# Patient Record
Sex: FEMALE | Race: WHITE | NOT HISPANIC OR LATINO | Employment: UNEMPLOYED | URBAN - NONMETROPOLITAN AREA
[De-identification: names, ages, dates, MRNs, and addresses within clinical notes are randomized per-mention and may not be internally consistent; named-entity substitution may affect disease eponyms.]

---

## 2017-02-06 ENCOUNTER — APPOINTMENT (OUTPATIENT)
Dept: ONCOLOGY | Facility: HOSPITAL | Age: 57
End: 2017-02-06

## 2017-03-10 ENCOUNTER — LAB (OUTPATIENT)
Dept: ONCOLOGY | Facility: HOSPITAL | Age: 57
End: 2017-03-10

## 2017-03-10 ENCOUNTER — CONSULT (OUTPATIENT)
Dept: ONCOLOGY | Facility: CLINIC | Age: 57
End: 2017-03-10

## 2017-03-10 VITALS
SYSTOLIC BLOOD PRESSURE: 168 MMHG | BODY MASS INDEX: 19.88 KG/M2 | WEIGHT: 119.3 LBS | RESPIRATION RATE: 16 BRPM | HEART RATE: 74 BPM | TEMPERATURE: 98.3 F | HEIGHT: 65 IN | DIASTOLIC BLOOD PRESSURE: 88 MMHG

## 2017-03-10 DIAGNOSIS — D64.9 ANEMIA, UNSPECIFIED TYPE: Primary | ICD-10-CM

## 2017-03-10 DIAGNOSIS — D64.9 ANEMIA, UNSPECIFIED TYPE: ICD-10-CM

## 2017-03-10 LAB
ALBUMIN SERPL-MCNC: 4.9 G/DL (ref 3.4–4.8)
ALBUMIN/GLOB SERPL: 1.8 G/DL (ref 1.1–1.8)
ALP SERPL-CCNC: 67 U/L (ref 38–126)
ALT SERPL W P-5'-P-CCNC: 25 U/L (ref 9–52)
ANION GAP SERPL CALCULATED.3IONS-SCNC: 12 MMOL/L (ref 5–15)
AST SERPL-CCNC: 32 U/L (ref 14–36)
BASOPHILS # BLD AUTO: 0.01 10*3/MM3 (ref 0–0.2)
BASOPHILS NFR BLD AUTO: 0.1 % (ref 0–2)
BILIRUB SERPL-MCNC: 0.5 MG/DL (ref 0.2–1.3)
BUN BLD-MCNC: 9 MG/DL (ref 7–21)
BUN/CREAT SERPL: 12.7 (ref 7–25)
CALCIUM SPEC-SCNC: 9.4 MG/DL (ref 8.4–10.2)
CHLORIDE SERPL-SCNC: 99 MMOL/L (ref 95–110)
CO2 SERPL-SCNC: 31 MMOL/L (ref 22–31)
CREAT BLD-MCNC: 0.71 MG/DL (ref 0.5–1)
DEPRECATED RDW RBC AUTO: 45.9 FL (ref 36.4–46.3)
EOSINOPHIL # BLD AUTO: 0.17 10*3/MM3 (ref 0–0.7)
EOSINOPHIL NFR BLD AUTO: 2.2 % (ref 0–7)
ERYTHROCYTE [DISTWIDTH] IN BLOOD BY AUTOMATED COUNT: 13.2 % (ref 11.5–14.5)
FERRITIN SERPL-MCNC: 26.6 NG/ML (ref 11.1–264)
FOLATE SERPL-MCNC: 10.6 NG/ML (ref 2.76–21)
GFR SERPL CREATININE-BSD FRML MDRD: 85 ML/MIN/1.73 (ref 51–120)
GLOBULIN UR ELPH-MCNC: 2.8 GM/DL (ref 2.3–3.5)
GLUCOSE BLD-MCNC: 112 MG/DL (ref 60–100)
HCT VFR BLD AUTO: 41 % (ref 35–45)
HCT VFR BLD AUTO: 41 % (ref 35–45)
HGB BLD-MCNC: 14.2 G/DL (ref 12–15.5)
HGB RETIC QN: 35.2 PG (ref 30–38)
IMM GRANULOCYTES # BLD: 0.01 10*3/MM3 (ref 0–0.02)
IMM GRANULOCYTES NFR BLD: 0.1 % (ref 0–0.5)
IMM RETICS NFR: 7 % (ref 3–15.9)
IRON 24H UR-MRATE: 87 MCG/DL (ref 37–170)
IRON SATN MFR SERPL: 23 % (ref 15–50)
LDH SERPL-CCNC: 518 U/L (ref 313–618)
LYMPHOCYTES # BLD AUTO: 3.26 10*3/MM3 (ref 0.6–4.2)
LYMPHOCYTES NFR BLD AUTO: 43.1 % (ref 10–50)
MCH RBC QN AUTO: 33.1 PG (ref 26.5–34)
MCHC RBC AUTO-ENTMCNC: 34.6 G/DL (ref 31.4–36)
MCV RBC AUTO: 95.6 FL (ref 80–98)
MONOCYTES # BLD AUTO: 0.48 10*3/MM3 (ref 0–0.9)
MONOCYTES NFR BLD AUTO: 6.3 % (ref 0–12)
NEUTROPHILS # BLD AUTO: 3.63 10*3/MM3 (ref 2–8.6)
NEUTROPHILS NFR BLD AUTO: 48.2 % (ref 37–80)
PLATELET # BLD AUTO: 238 10*3/MM3 (ref 150–450)
PMV BLD AUTO: 10.5 FL (ref 8–12)
POTASSIUM BLD-SCNC: 3.7 MMOL/L (ref 3.5–5.1)
PROT SERPL-MCNC: 7.7 G/DL (ref 6.3–8.6)
RBC # BLD AUTO: 4.29 10*6/MM3 (ref 3.77–5.16)
RETICS #: 0.03 10*6/MM3 (ref 0.03–0.12)
RETICS/RBC NFR AUTO: 0.74 % (ref 0.63–2.13)
RETICULOCYTE PRODUCTION INDEX: 0.56 % (ref 0.63–2.13)
SODIUM BLD-SCNC: 142 MMOL/L (ref 137–145)
TIBC SERPL-MCNC: 375 MCG/DL (ref 265–497)
VIT B12 BLD-MCNC: 193 PG/ML (ref 239–931)
WBC NRBC COR # BLD: 7.56 10*3/MM3 (ref 3.2–9.8)

## 2017-03-10 PROCEDURE — 82728 ASSAY OF FERRITIN: CPT | Performed by: INTERNAL MEDICINE

## 2017-03-10 PROCEDURE — 83010 ASSAY OF HAPTOGLOBIN QUANT: CPT | Performed by: INTERNAL MEDICINE

## 2017-03-10 PROCEDURE — 85025 COMPLETE CBC W/AUTO DIFF WBC: CPT | Performed by: INTERNAL MEDICINE

## 2017-03-10 PROCEDURE — 99406 BEHAV CHNG SMOKING 3-10 MIN: CPT | Performed by: INTERNAL MEDICINE

## 2017-03-10 PROCEDURE — 99203 OFFICE O/P NEW LOW 30 MIN: CPT | Performed by: INTERNAL MEDICINE

## 2017-03-10 PROCEDURE — 83540 ASSAY OF IRON: CPT | Performed by: INTERNAL MEDICINE

## 2017-03-10 PROCEDURE — 82746 ASSAY OF FOLIC ACID SERUM: CPT | Performed by: INTERNAL MEDICINE

## 2017-03-10 PROCEDURE — 83615 LACTATE (LD) (LDH) ENZYME: CPT | Performed by: INTERNAL MEDICINE

## 2017-03-10 PROCEDURE — 36415 COLL VENOUS BLD VENIPUNCTURE: CPT | Performed by: INTERNAL MEDICINE

## 2017-03-10 PROCEDURE — 83921 ORGANIC ACID SINGLE QUANT: CPT | Performed by: INTERNAL MEDICINE

## 2017-03-10 PROCEDURE — 80053 COMPREHEN METABOLIC PANEL: CPT | Performed by: INTERNAL MEDICINE

## 2017-03-10 PROCEDURE — 83090 ASSAY OF HOMOCYSTEINE: CPT | Performed by: INTERNAL MEDICINE

## 2017-03-10 PROCEDURE — 83550 IRON BINDING TEST: CPT | Performed by: INTERNAL MEDICINE

## 2017-03-10 PROCEDURE — 82607 VITAMIN B-12: CPT | Performed by: INTERNAL MEDICINE

## 2017-03-10 PROCEDURE — 85046 RETICYTE/HGB CONCENTRATE: CPT | Performed by: INTERNAL MEDICINE

## 2017-03-10 RX ORDER — EPINEPHRINE 0.3 MG/.3ML
0.3 INJECTION SUBCUTANEOUS
COMMUNITY

## 2017-03-10 RX ORDER — AMLODIPINE BESYLATE 5 MG/1
TABLET ORAL
COMMUNITY
Start: 2016-12-27

## 2017-03-10 NOTE — PROGRESS NOTES
DATE OF CONSULT: 3/10/2017    REQUESTING SOURCE:  Aldair Issa MD      REASON FOR CONSULTATION: Vitamin B12 deficiency      HISTORY OF PRESENT ILLNESS:    57-year-old female with a past medical history significant for alpha galactosidase deficiency, history of vitamin B12 deficiency diagnosed almost a year ago for which she try taking sublingual vitamin B12 but ended up having hives so currently not on any supplement for that.  Patient is being referred to [facility] for recommendation regarding vitamin B12 deficiency management in person with allergy to vitamin B12.  Patient complains of fatigue.  Denies any fever or chills or night sweats.  Denies any abnormal swollen and anywhere in the body.  Denies any tingling or numbness in upper or lower extremity.  Denies any problem with the balance.    PAST MEDICAL HISTORY:    Past Medical History   Diagnosis Date   • Alpha galactosidase deficiency    • Hypertension        PAST SURGICAL HISTORY:  History reviewed. No pertinent past surgical history.    ALLERGIES:    Allergies   Allergen Reactions   • Azithromycin    • Aztreonam    • Beef Extract    • Cefdinir    • Celecoxib    • Corticosteroids      PILLS ARE NOT A PROBLEM-JUST STEROID SHOTS   • Decongestant  [Pseudoephedrine]    • Ketorolac    • Levofloxacin    • Methylprednisolone      Broke out in rash all over    • Milk-Related Compounds    • Nuts    • Peanut Oil    • Penicillins    • Pimecrolimus    • Pork Allergy    • Promethazine    • Saccharin    • Sulfa Antibiotics    • Tacrolimus    • Thiazide-Type Diuretics        SOCIAL HISTORY:   Social History   Substance Use Topics   • Smoking status: Current Every Day Smoker     Packs/day: 1.00     Years: 13.00     Types: Cigarettes   • Smokeless tobacco: None   • Alcohol use No       CURRENT MEDICATIONS:    Current Outpatient Prescriptions   Medication Sig Dispense Refill   • amLODIPine (NORVASC) 5 MG tablet take 1 tablet by mouth once daily     • EPINEPHrine  (EPIPEN) 0.3 MG/0.3ML solution auto-injector injection Inject 0.3 mg into the shoulder, thigh, or buttocks.       No current facility-administered medications for this visit.             FAMILY HISTORY:    Family History   Problem Relation Age of Onset   • Cancer Mother    • Thyroid disease Mother    • Hypertension Mother    • Vitamin D deficiency Mother    • Cancer Father    • Diabetes Father    • Heart attack Father    • No Known Problems Brother    • No Known Problems Brother      Both mother and father had skin cancers.  Denies any other cancer or blood disorder or blood clot in the family.      REVIEW OF SYSTEMS:      CONSTITUTIONAL:  Complains of fatigue. Denies any fever, chills or weight loss.     HEENT:  No epistaxis, mouth sores or difficulty swallowing.    RESPIRATORY:  No new shortness of breath. No new cough or hemoptysis.    CARDIOVASCULAR:  No chest pain or palpitations.    GASTROINTESTINAL:  No abdominal pain nausea, vomiting or blood in the stool.    GENITOURINARY: No Dysuria or Hematuria.    MUSCULOSKELETAL:  No new back pain or arthralgia..    LYMPHATICS:  Denies any abnormal swollen glands anywhere in the body.    NEUROLOGICAL : No tingling or numbness. No headache or dizziness. No seizures or balance problems.    SKIN: No new skin lesions.    PHYSICAL EXAMINATION:      VITAL SIGNS:  Temp:  [98.3 °F (36.8 °C)] 98.3 °F (36.8 °C)  Heart Rate:  [74] 74  Resp:  [16] 16  BP: (168)/(88) 168/88    GENERAL:  Not in any distress.    HEENT:  Normocephalic, Atraumatic.Eyes  Shows mild pallor. No icterus. Extraocular Movements Intact. No Fascial Asymmetry noted.    NECK:  No adenopathy. NO JVD.    RESPIRATORY:  Fair air entry bilateral. No rhonchi or wheezing.    CARDIOVASCULAR:  S1, S2. Regular rate and rhythm. No murmur or gallop appreciated.    ABDOMEN:  Soft, obese, nontender. Bowel sounds present in all four quadrants.  No organomegaly appreciated.    EXTREMITIES:  No edema.No Calf  Tenderness.    NEUROLOGIC:  Alert, awake and oriented ×3.  No  Motor or sensory deficit appreciated. Cranial Nerves 2-12 grossly intact.        DIAGNOSTIC DATA:    WBC   Date Value Ref Range Status   03/10/2017 7.56 3.20 - 9.80 10*3/mm3 Final     RBC   Date Value Ref Range Status   03/10/2017 4.29 3.77 - 5.16 10*6/mm3 Final     HEMOGLOBIN   Date Value Ref Range Status   03/10/2017 14.2 12.0 - 15.5 g/dL Final     HEMATOCRIT   Date Value Ref Range Status   03/10/2017 41.0 35.0 - 45.0 % Final   03/10/2017 41.0 35.0 - 45.0 % Final     MCV   Date Value Ref Range Status   03/10/2017 95.6 80.0 - 98.0 fL Final     MCH   Date Value Ref Range Status   03/10/2017 33.1 26.5 - 34.0 pg Final     MCHC   Date Value Ref Range Status   03/10/2017 34.6 31.4 - 36.0 g/dL Final     RDW   Date Value Ref Range Status   03/10/2017 13.2 11.5 - 14.5 % Final     RDW-SD   Date Value Ref Range Status   03/10/2017 45.9 36.4 - 46.3 fl Final     MPV   Date Value Ref Range Status   03/10/2017 10.5 8.0 - 12.0 fL Final     PLATELETS   Date Value Ref Range Status   03/10/2017 238 150 - 450 10*3/mm3 Final     NEUTROPHIL %   Date Value Ref Range Status   03/10/2017 48.2 37.0 - 80.0 % Final     LYMPHOCYTE %   Date Value Ref Range Status   03/10/2017 43.1 10.0 - 50.0 % Final     MONOCYTE %   Date Value Ref Range Status   03/10/2017 6.3 0.0 - 12.0 % Final     EOSINOPHIL %   Date Value Ref Range Status   03/10/2017 2.2 0.0 - 7.0 % Final     BASOPHIL %   Date Value Ref Range Status   03/10/2017 0.1 0.0 - 2.0 % Final     IMMATURE GRANS %   Date Value Ref Range Status   03/10/2017 0.1 0.0 - 0.5 % Final     NEUTROPHILS, ABSOLUTE   Date Value Ref Range Status   03/10/2017 3.63 2.00 - 8.60 10*3/mm3 Final     LYMPHOCYTES, ABSOLUTE   Date Value Ref Range Status   03/10/2017 3.26 0.60 - 4.20 10*3/mm3 Final     MONOCYTES, ABSOLUTE   Date Value Ref Range Status   03/10/2017 0.48 0.00 - 0.90 10*3/mm3 Final     EOSINOPHILS, ABSOLUTE   Date Value Ref Range Status    03/10/2017 0.17 0.00 - 0.70 10*3/mm3 Final     BASOPHILS, ABSOLUTE   Date Value Ref Range Status   03/10/2017 0.01 0.00 - 0.20 10*3/mm3 Final     IMMATURE GRANS, ABSOLUTE   Date Value Ref Range Status   03/10/2017 0.01 0.00 - 0.02 10*3/mm3 Final     Last vitamin B12 level done at [facility] in November 2016 was low at 165.      ASSESSMENT AND PLAN:      1.  Vitamin B12 deficiency without any evident anemia: Patient's vitamin B12 level done in November 2016 was low at 165 but her hemoglobin done today is 14.5.  Patient states she tries sublingual vitamin B-12 and ended up having hives all over body.  With a history of multiple allergies and on file galactosidase deficiency would not recommend trying another round of vitamin B12 supplement at this point.  We'll refer patient to [facility] to get seen at the hematology clinic and get an opinion from than what to do about her vitamin B12 and vitamin D deficiency.  We will see patient back in about 6 weeks with a repeat CBC done on that day.  Recommendations were discussed with the patient and she is in agreement with that.    2.  Alpha galactosidase deficiency.  Patient has been following up with allergist in  [place].  Recommend continuing following with them.    3.  Health maintenance: Patient smokes about pack per day, she was counseled about smoking cessation.  About 3 minutes were spent for smoking cessation counseling today.  She remains full code.      Thank you for this consultation.    Michel Fernández MD  3/10/2017  2:05 PM

## 2017-03-11 LAB
HAPTOGLOB SERPL-MCNC: 129 MG/DL (ref 34–200)
HCYS SERPL-SCNC: 27.9 UMOL/L (ref 0–15)

## 2017-03-14 LAB — METHYLMALONATE SERPL-SCNC: 2203 NMOL/L (ref 0–378)

## 2017-03-28 ENCOUNTER — DOCUMENTATION (OUTPATIENT)
Dept: NUTRITION | Facility: HOSPITAL | Age: 57
End: 2017-03-28

## 2017-03-28 NOTE — PROGRESS NOTES
"Adult Outpatient Nutrition  Assessment    Patient Name:  Terra Serna  YOB: 1960  MRN: [id number]        Assessment Date:  3/27/2017 late entry phone assessment     CHART REVIEW  Terra Serna  1960  57 y.o.  Wt: \"112\"  Ht: 65 in  Dx: anemia/B12 deficiency/alpha galactosidase deficiency/lactose intolerance      PATIENT/FAMILY COMMENTS  Usual Body Weight: 125 lb  Weight Comments: lost 10% body weight  Diet: no dairy/nuts/mammal products/processed food in general due to allergies--can eat fish/chicken/eggs/fruit/veg/grains    Allergy Reactions: respiratory (has epi pen)/\"stomach pain\"/skin reaction/diarrhea  Supplements: afraid to try   Food Preparation: patient  Nutrition Concerns:  -unintentional weight loss  -food allergies  -diarrhea        GOAL(s)  Optimize nutrition in attempt to prevent further loss of LBM      EDUCATION  -High calorie high protein diet with small frequent feedings  -Supplementation        * Provided samples of Ensure Clear & discount coupons (ready for pt to )  -Cooking from scratch in bulk and freezing.      To reinforce education, written information with RD's name & number mailed.  Pt receptive to suggestions, and agreed to call on dietitian as needed.    Comments:  57WF coming to [facility] due to anemia. RN ask that RD call patient re:  multiple food allergies/weight loss/fear of eating. Pt has not tried soy milk  (may be an option). Pt plans to ask allergy doctor about this. If acceptable,   could add frozen fruit to make smoothies. Feel Ensure Clear may be an option as well.      Electronically signed by:  Mindy Galindo RD  03/28/17 11:07 AM   "

## 2017-04-10 ENCOUNTER — TELEPHONE (OUTPATIENT)
Dept: ONCOLOGY | Facility: CLINIC | Age: 57
End: 2017-04-10

## 2017-04-10 NOTE — TELEPHONE ENCOUNTER
SW placed call in follow up to initial notification of distress score.  Voice message left requesting call back.

## 2017-04-20 DIAGNOSIS — D64.9 ANEMIA, UNSPECIFIED TYPE: Primary | ICD-10-CM

## 2017-04-21 ENCOUNTER — OFFICE VISIT (OUTPATIENT)
Dept: ONCOLOGY | Facility: CLINIC | Age: 57
End: 2017-04-21

## 2017-04-21 ENCOUNTER — LAB (OUTPATIENT)
Dept: ONCOLOGY | Facility: HOSPITAL | Age: 57
End: 2017-04-21

## 2017-04-21 VITALS
BODY MASS INDEX: 19.84 KG/M2 | SYSTOLIC BLOOD PRESSURE: 145 MMHG | HEIGHT: 65 IN | WEIGHT: 119.1 LBS | TEMPERATURE: 98.2 F | DIASTOLIC BLOOD PRESSURE: 74 MMHG | RESPIRATION RATE: 16 BRPM | HEART RATE: 68 BPM

## 2017-04-21 DIAGNOSIS — D51.9 ANEMIA DUE TO VITAMIN B12 DEFICIENCY, UNSPECIFIED B12 DEFICIENCY TYPE: Primary | ICD-10-CM

## 2017-04-21 DIAGNOSIS — D64.9 ANEMIA, UNSPECIFIED TYPE: ICD-10-CM

## 2017-04-21 LAB
BASOPHILS # BLD AUTO: 0.01 10*3/MM3 (ref 0–0.2)
BASOPHILS NFR BLD AUTO: 0.2 % (ref 0–2)
DEPRECATED RDW RBC AUTO: 46.9 FL (ref 36.4–46.3)
EOSINOPHIL # BLD AUTO: 0.15 10*3/MM3 (ref 0–0.7)
EOSINOPHIL NFR BLD AUTO: 2.4 % (ref 0–7)
ERYTHROCYTE [DISTWIDTH] IN BLOOD BY AUTOMATED COUNT: 13.4 % (ref 11.5–14.5)
HCT VFR BLD AUTO: 39.6 % (ref 35–45)
HGB BLD-MCNC: 13.6 G/DL (ref 12–15.5)
IMM GRANULOCYTES # BLD: 0.02 10*3/MM3 (ref 0–0.02)
IMM GRANULOCYTES NFR BLD: 0.3 % (ref 0–0.5)
LYMPHOCYTES # BLD AUTO: 2.66 10*3/MM3 (ref 0.6–4.2)
LYMPHOCYTES NFR BLD AUTO: 43.4 % (ref 10–50)
MCH RBC QN AUTO: 32.8 PG (ref 26.5–34)
MCHC RBC AUTO-ENTMCNC: 34.3 G/DL (ref 31.4–36)
MCV RBC AUTO: 95.4 FL (ref 80–98)
MONOCYTES # BLD AUTO: 0.37 10*3/MM3 (ref 0–0.9)
MONOCYTES NFR BLD AUTO: 6 % (ref 0–12)
NEUTROPHILS # BLD AUTO: 2.92 10*3/MM3 (ref 2–8.6)
NEUTROPHILS NFR BLD AUTO: 47.7 % (ref 37–80)
PLATELET # BLD AUTO: 231 10*3/MM3 (ref 150–450)
PMV BLD AUTO: 10.4 FL (ref 8–12)
RBC # BLD AUTO: 4.15 10*6/MM3 (ref 3.77–5.16)
WBC NRBC COR # BLD: 6.13 10*3/MM3 (ref 3.2–9.8)

## 2017-04-21 PROCEDURE — G0463 HOSPITAL OUTPT CLINIC VISIT: HCPCS | Performed by: INTERNAL MEDICINE

## 2017-04-21 PROCEDURE — 99214 OFFICE O/P EST MOD 30 MIN: CPT | Performed by: INTERNAL MEDICINE

## 2017-04-21 PROCEDURE — 36415 COLL VENOUS BLD VENIPUNCTURE: CPT | Performed by: INTERNAL MEDICINE

## 2017-04-21 PROCEDURE — 85025 COMPLETE CBC W/AUTO DIFF WBC: CPT

## 2017-04-21 RX ORDER — MONTELUKAST SODIUM 10 MG/1
10 TABLET ORAL
COMMUNITY
Start: 2017-04-06 | End: 2018-04-07

## 2017-04-21 NOTE — PROGRESS NOTES
DATE OF VISIT: 4/21/2017    REASON FOR VISIT: Vitamin B12 deficiency with allergy to vitamin B12 supplements    HISTORY OF PRESENT ILLNESS:    57-year-old female with a past medical history significant for alpha galactosidase deficiency, history of vitamin B12 deficiency diagnosed almost a year ago for which she try taking sublingual vitamin B12 but ended up having hives was seen in consultation here at [facility] on March 10, 2017.  Patient had anemia workup done on that day.  Patient is here to discuss the result of blood work.  Still complains of fatigue.  Denies any blood in the stool or urine.  Denies any abnormal swollen glands anywhere in the body.    PAST MEDICAL HISTORY:    Past Medical History:   Diagnosis Date   • Alpha galactosidase deficiency    • Hypertension        SOCIAL HISTORY:    Social History   Substance Use Topics   • Smoking status: Current Every Day Smoker     Packs/day: 1.00     Years: 13.00     Types: Cigarettes   • Smokeless tobacco: None   • Alcohol use No       Surgical History :  History reviewed. No pertinent surgical history.    ALLERGIES:    Allergies   Allergen Reactions   • Azithromycin    • Aztreonam    • Beef Extract    • Cefdinir    • Celecoxib    • Corticosteroids      PILLS ARE NOT A PROBLEM-JUST STEROID SHOTS   • Decongestant  [Pseudoephedrine]    • Ketorolac    • Levofloxacin    • Methylprednisolone      Broke out in rash all over    • Milk-Related Compounds    • Nuts    • Peanut Oil    • Penicillins    • Pimecrolimus    • Pork Allergy    • Promethazine    • Saccharin    • Sulfa Antibiotics    • Tacrolimus    • Thiazide-Type Diuretics        REVIEW OF SYSTEMS:      CONSTITUTIONAL: Complains of fatigue. Denies any fever, chills or weight loss.      HEENT: No epistaxis, mouth sores or difficulty swallowing.     RESPIRATORY: No new shortness of breath. No new cough or hemoptysis.     CARDIOVASCULAR: No chest pain or palpitations.     GASTROINTESTINAL: No abdominal pain  "nausea, vomiting or blood in the stool.     GENITOURINARY: No Dysuria or Hematuria.     MUSCULOSKELETAL: No new back pain or arthralgia..     LYMPHATICS: Denies any abnormal swollen glands anywhere in the body.     NEUROLOGICAL : No tingling or numbness. No headache or dizziness. No seizures or balance problems.     SKIN: No new skin lesions.        PHYSICAL EXAMINATION:      VITAL SIGNS:  /74  Pulse 68  Temp 98.2 °F (36.8 °C)  Resp 16  Ht 65\" (165.1 cm)  Wt 119 lb 1.6 oz (54 kg)  BMI 19.82 kg/m2    GENERAL: Not in any distress.     HEENT: Normocephalic, Atraumatic.Eyes  Shows mild pallor. No icterus. Extraocular Movements Intact. No Fascial Asymmetry noted.     NECK: No adenopathy. NO JVD.     RESPIRATORY: Fair air entry bilateral. No rhonchi or wheezing.     CARDIOVASCULAR: S1, S2. Regular rate and rhythm. No murmur or gallop appreciated.     ABDOMEN: Soft, obese, nontender. Bowel sounds present in all four quadrants. No organomegaly appreciated.     EXTREMITIES: No edema.No Calf Tenderness.     NEUROLOGIC: Alert, awake and oriented ×3. No Motor or sensory deficit appreciated. Cranial Nerves 2-12 grossly intact.      DIAGNOSTIC DATA:    Glucose   Date Value Ref Range Status   03/10/2017 112 (H) 60 - 100 mg/dL Final     Sodium   Date Value Ref Range Status   03/10/2017 142 137 - 145 mmol/L Final     Potassium   Date Value Ref Range Status   03/10/2017 3.7 3.5 - 5.1 mmol/L Final     CO2   Date Value Ref Range Status   03/10/2017 31.0 22.0 - 31.0 mmol/L Final     Chloride   Date Value Ref Range Status   03/10/2017 99 95 - 110 mmol/L Final     Anion Gap   Date Value Ref Range Status   03/10/2017 12.0 5.0 - 15.0 mmol/L Final     Creatinine   Date Value Ref Range Status   03/10/2017 0.71 0.50 - 1.00 mg/dL Final     BUN   Date Value Ref Range Status   03/10/2017 9 7 - 21 mg/dL Final     BUN/Creatinine Ratio   Date Value Ref Range Status   03/10/2017 12.7 7.0 - 25.0 Final     Calcium   Date Value Ref Range " Status   03/10/2017 9.4 8.4 - 10.2 mg/dL Final     eGFR Non  Amer   Date Value Ref Range Status   03/10/2017 85 51 - 120 mL/min/1.73 Final     Alkaline Phosphatase   Date Value Ref Range Status   03/10/2017 67 38 - 126 U/L Final     Total Protein   Date Value Ref Range Status   03/10/2017 7.7 6.3 - 8.6 g/dL Final     ALT (SGPT)   Date Value Ref Range Status   03/10/2017 25 9 - 52 U/L Final     AST (SGOT)   Date Value Ref Range Status   03/10/2017 32 14 - 36 U/L Final     Total Bilirubin   Date Value Ref Range Status   03/10/2017 0.5 0.2 - 1.3 mg/dL Final     Albumin   Date Value Ref Range Status   03/10/2017 4.90 (H) 3.40 - 4.80 g/dL Final     Globulin   Date Value Ref Range Status   03/10/2017 2.8 2.3 - 3.5 gm/dL Final     A/G Ratio   Date Value Ref Range Status   03/10/2017 1.8 1.1 - 1.8 g/dL Final     Lab Results   Component Value Date    WBC 6.13 04/21/2017    HGB 13.6 04/21/2017    HCT 39.6 04/21/2017    MCV 95.4 04/21/2017     04/21/2017     Lab Results   Component Value Date    NEUTROABS 2.92 04/21/2017    IRON 87 03/10/2017    TIBC 375 03/10/2017    LABIRON 23 03/10/2017    FERRITIN 26.60 03/10/2017    [id number] 193 (L) 03/10/2017    FOLATE 10.60 03/10/2017     Methylmalonic Acid, Serum   Order: [id number]   Status:  Final result   Visible to patient:  No (Not Released) Dx:  Anemia, unspecified type      Ref Range & Units 1mo ago     Methylmalonic Acid 0 - 378 nmol/L 2203 (H)             Homocysteine   Order: [id number]   Status:  Final result   Visible to patient:  No (Not Released) Dx:  Anemia, unspecified type      Ref Range & Units 1mo ago     Homocystine, Plasma (Quant) 0.0 - 15.0 umol/L 27.9 (H)                   ASSESSMENT AND PLAN:      1.  Vitamin B12 deficiency with elevated methylmalonic acid and homocysteine: Patient has ongoing vitamin B12 deficiency for more than 1 year now.  In the past she has been tried on some lingual vitamin B12 which ended up giving hives.  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  Patient has been referred to [place] upon last clinic visit to get an opinion regarding her vitamin B12 deficiency treatment.  Patient has not been to [place] yet.  Again it was explained to platelets shunt in detail that she needs to find a solution for her vitamin B12 deficiency before she started developing anemia.  She is in agreement to go and gets seen by a hematologist at [facility] at this point.  We will see her back in about 6 weeks with a repeat CBC to be done on that day.    2. Alpha galactosidase deficiency. Patient has been following up with allergist in  [place]. Recommend continuing following with them.     3. Health maintenance: Patient smokes about pack per day, she was counseled about smoking cessation. About 3 minutes were spent for smoking cessation counseling today. She remains full code.         Michel Fernández MD  4/21/2017  12:29 PM        EMR Dragon/Transcription disclaimer:   Much of this encounter note is an electronic transcription/translation of spoken language to printed text. The electronic translation of spoken language may permit erroneous, or at times, nonsensical words or phrases to be inadvertently transcribed; Although I have reviewed the note for such errors, some may still exist.

## 2017-06-05 ENCOUNTER — APPOINTMENT (OUTPATIENT)
Dept: ONCOLOGY | Facility: HOSPITAL | Age: 57
End: 2017-06-05

## 2017-07-13 ENCOUNTER — TRANSCRIBE ORDERS (OUTPATIENT)
Dept: LAB | Facility: HOSPITAL | Age: 57
End: 2017-07-13

## 2017-07-13 DIAGNOSIS — D51.9 ANEMIA DUE TO VITAMIN B12 DEFICIENCY, UNSPECIFIED B12 DEFICIENCY TYPE: Primary | ICD-10-CM

## 2018-02-07 ENCOUNTER — HOSPITAL (OUTPATIENT)
Dept: OTHER | Age: 58
End: 2018-02-07
Attending: ORTHOPAEDIC SURGERY

## 2018-06-01 ENCOUNTER — HOSPITAL (OUTPATIENT)
Dept: OTHER | Age: 58
End: 2018-06-01
Attending: INTERNAL MEDICINE

## 2019-01-01 ENCOUNTER — EXTERNAL RECORD (OUTPATIENT)
Dept: OTHER | Age: 59
End: 2019-01-01

## 2019-03-22 ENCOUNTER — HOSPITAL (OUTPATIENT)
Dept: OTHER | Age: 59
End: 2019-03-22
Attending: EMERGENCY MEDICINE

## 2019-03-22 ENCOUNTER — DIAGNOSTIC TRANS (OUTPATIENT)
Dept: OTHER | Age: 59
End: 2019-03-22

## 2019-03-22 LAB
ALBUMIN SERPL-MCNC: 3.7 GM/DL (ref 3.6–5.1)
ALBUMIN/GLOB SERPL: 1.2 {RATIO} (ref 1–2.4)
ALP SERPL-CCNC: 86 UNIT/L (ref 45–117)
ALT SERPL-CCNC: 33 UNIT/L
ANALYZER ANC (IANC): ABNORMAL
ANION GAP SERPL CALC-SCNC: 10 MMOL/L (ref 10–20)
AST SERPL-CCNC: 28 UNIT/L
BASOPHILS # BLD: 0 THOUSAND/MCL (ref 0–0.3)
BASOPHILS NFR BLD: 1 %
BILIRUB SERPL-MCNC: 0.3 MG/DL (ref 0.2–1)
BUN SERPL-MCNC: 12 MG/DL (ref 6–20)
BUN/CREAT SERPL: 18 (ref 7–25)
CALCIUM SERPL-MCNC: 8.2 MG/DL (ref 8.4–10.2)
CHLORIDE: 111 MMOL/L (ref 98–107)
CO2 SERPL-SCNC: 24 MMOL/L (ref 21–32)
CREAT SERPL-MCNC: 0.67 MG/DL (ref 0.51–0.95)
DIFFERENTIAL METHOD BLD: ABNORMAL
EOSINOPHIL # BLD: 0.1 THOUSAND/MCL (ref 0.1–0.5)
EOSINOPHIL NFR BLD: 4 %
ERYTHROCYTE [DISTWIDTH] IN BLOOD: 13.1 % (ref 11–15)
GLOBULIN SER-MCNC: 3.1 GM/DL (ref 2–4)
GLUCOSE SERPL-MCNC: 133 MG/DL (ref 65–99)
HEMATOCRIT: 38.8 % (ref 36–46.5)
HGB BLD-MCNC: 12.5 GM/DL (ref 12–15.5)
IMM GRANULOCYTES # BLD AUTO: 0 THOUSAND/MCL (ref 0–0.2)
IMM GRANULOCYTES NFR BLD: 0 %
INR PPP: 1
LYMPHOCYTES # BLD: 0.8 THOUSAND/MCL (ref 1–4)
LYMPHOCYTES NFR BLD: 21 %
MAGNESIUM SERPL-MCNC: 1.9 MG/DL (ref 1.7–2.4)
MCH RBC QN AUTO: 29.6 PG (ref 26–34)
MCHC RBC AUTO-ENTMCNC: 32.2 GM/DL (ref 32–36.5)
MCV RBC AUTO: 91.7 FL (ref 78–100)
MONOCYTES # BLD: 0.4 THOUSAND/MCL (ref 0.3–0.9)
MONOCYTES NFR BLD: 11 %
NEUTROPHILS # BLD: 2.4 THOUSAND/MCL (ref 1.8–7.7)
NEUTROPHILS NFR BLD: 63 %
NEUTS SEG NFR BLD: ABNORMAL %
NRBC (NRBCRE): 0 /100 WBC
PLATELET # BLD: 149 THOUSAND/MCL (ref 140–450)
POTASSIUM SERPL-SCNC: 4 MMOL/L (ref 3.4–5.1)
PROT SERPL-MCNC: 6.8 GM/DL (ref 6.4–8.2)
PROTHROMBIN TIME: 10.7 SECONDS (ref 9.7–11.8)
PROTHROMBIN TIME: NORMAL
RBC # BLD: 4.23 MILLION/MCL (ref 4–5.2)
SODIUM SERPL-SCNC: 141 MMOL/L (ref 135–145)
TROPONIN I SERPL HS-MCNC: <0.02 NG/ML
TROPONIN I SERPL HS-MCNC: <0.02 NG/ML
WBC # BLD: 3.7 THOUSAND/MCL (ref 4.2–11)

## 2019-07-01 ENCOUNTER — TELEPHONE (OUTPATIENT)
Dept: PAIN MANAGEMENT | Age: 59
End: 2019-07-01

## 2019-07-17 ENCOUNTER — OFFICE VISIT (OUTPATIENT)
Dept: PAIN MANAGEMENT | Age: 59
End: 2019-07-17

## 2019-07-17 DIAGNOSIS — M25.561 CHRONIC PAIN OF RIGHT KNEE: ICD-10-CM

## 2019-07-17 DIAGNOSIS — M54.16 LUMBAR RADICULOPATHY: Primary | ICD-10-CM

## 2019-07-17 DIAGNOSIS — G89.29 CHRONIC PAIN OF RIGHT KNEE: ICD-10-CM

## 2019-07-17 PROCEDURE — 99204 OFFICE O/P NEW MOD 45 MIN: CPT | Performed by: PHYSICIAN ASSISTANT

## 2019-07-17 RX ORDER — VENLAFAXINE HYDROCHLORIDE 37.5 MG/1
75 CAPSULE, EXTENDED RELEASE ORAL DAILY
COMMUNITY
End: 2022-03-11

## 2019-07-17 RX ORDER — DULOXETIN HYDROCHLORIDE 60 MG/1
60 CAPSULE, DELAYED RELEASE ORAL AT BEDTIME
COMMUNITY
End: 2022-03-11

## 2019-07-17 RX ORDER — GABAPENTIN 300 MG/1
300 CAPSULE ORAL
COMMUNITY
End: 2019-07-17 | Stop reason: SDUPTHER

## 2019-07-17 RX ORDER — GABAPENTIN 300 MG/1
CAPSULE ORAL
Qty: 90 CAPSULE | Refills: 0 | Status: SHIPPED | OUTPATIENT
Start: 2019-07-17 | End: 2019-08-08 | Stop reason: SDUPTHER

## 2019-07-17 ASSESSMENT — PAIN SCALES - GENERAL: PAINLEVEL: 9-10

## 2019-08-07 ENCOUNTER — APPOINTMENT (OUTPATIENT)
Dept: PAIN MANAGEMENT | Age: 59
End: 2019-08-07

## 2019-08-08 ENCOUNTER — OFFICE VISIT (OUTPATIENT)
Dept: PAIN MANAGEMENT | Age: 59
End: 2019-08-08

## 2019-08-08 DIAGNOSIS — M48.062 SPINAL STENOSIS OF LUMBAR REGION WITH NEUROGENIC CLAUDICATION: Primary | ICD-10-CM

## 2019-08-08 PROCEDURE — 99214 OFFICE O/P EST MOD 30 MIN: CPT | Performed by: PHYSICIAN ASSISTANT

## 2019-08-08 RX ORDER — NABUMETONE 750 MG/1
750 TABLET, FILM COATED ORAL 2 TIMES DAILY PRN
Qty: 60 TABLET | Refills: 0 | Status: SHIPPED | OUTPATIENT
Start: 2019-08-08 | End: 2019-09-17 | Stop reason: SDUPTHER

## 2019-08-08 RX ORDER — GABAPENTIN 300 MG/1
CAPSULE ORAL
Qty: 90 CAPSULE | Refills: 1 | Status: SHIPPED | OUTPATIENT
Start: 2019-08-08 | End: 2019-09-24 | Stop reason: SDUPTHER

## 2019-08-08 ASSESSMENT — PAIN SCALES - GENERAL: PAINLEVEL: 7-8

## 2019-08-15 ENCOUNTER — HOSPITAL (OUTPATIENT)
Dept: OTHER | Age: 59
End: 2019-08-15

## 2019-08-16 RX ORDER — GABAPENTIN 300 MG/1
CAPSULE ORAL
Qty: 90 CAPSULE | Refills: 0 | OUTPATIENT
Start: 2019-08-16

## 2019-09-03 ENCOUNTER — HOSPITAL (OUTPATIENT)
Dept: OTHER | Age: 59
End: 2019-09-03
Attending: ANESTHESIOLOGY

## 2019-09-03 PROCEDURE — 62323 NJX INTERLAMINAR LMBR/SAC: CPT | Performed by: ANESTHESIOLOGY

## 2019-09-11 ENCOUNTER — HOSPITAL (OUTPATIENT)
Dept: OTHER | Age: 59
End: 2019-09-11
Attending: PHYSICIAN ASSISTANT

## 2019-09-11 ENCOUNTER — HOSPITAL (OUTPATIENT)
Dept: OTHER | Age: 59
End: 2019-09-11
Attending: INTERNAL MEDICINE

## 2019-09-17 DIAGNOSIS — M54.16 LUMBAR RADICULOPATHY: Primary | ICD-10-CM

## 2019-09-17 RX ORDER — NABUMETONE 750 MG/1
TABLET, FILM COATED ORAL
Qty: 60 TABLET | Refills: 0 | Status: SHIPPED | OUTPATIENT
Start: 2019-09-17 | End: 2019-10-31 | Stop reason: SDUPTHER

## 2019-09-24 ENCOUNTER — OFFICE VISIT (OUTPATIENT)
Dept: PAIN MANAGEMENT | Age: 59
End: 2019-09-24

## 2019-09-24 DIAGNOSIS — M54.16 LUMBAR RADICULOPATHY: ICD-10-CM

## 2019-09-24 DIAGNOSIS — M48.062 SPINAL STENOSIS OF LUMBAR REGION WITH NEUROGENIC CLAUDICATION: Primary | ICD-10-CM

## 2019-09-24 PROCEDURE — 99214 OFFICE O/P EST MOD 30 MIN: CPT | Performed by: PHYSICIAN ASSISTANT

## 2019-09-24 RX ORDER — GABAPENTIN 300 MG/1
CAPSULE ORAL
Qty: 90 CAPSULE | Refills: 1 | Status: SHIPPED | OUTPATIENT
Start: 2019-09-24 | End: 2019-12-03 | Stop reason: SDUPTHER

## 2019-09-24 RX ORDER — HYDROCODONE BITARTRATE AND ACETAMINOPHEN 10; 325 MG/1; MG/1
TABLET ORAL
Qty: 40 TABLET | Refills: 0 | Status: SHIPPED | OUTPATIENT
Start: 2019-09-24 | End: 2019-10-17 | Stop reason: SDUPTHER

## 2019-09-24 ASSESSMENT — PAIN SCALES - GENERAL: PAINLEVEL: 7-8

## 2019-10-10 ENCOUNTER — PREP FOR CASE (OUTPATIENT)
Dept: PAIN MANAGEMENT | Age: 59
End: 2019-10-10

## 2019-10-10 DIAGNOSIS — M54.16 LUMBAR RADICULOPATHY: Primary | ICD-10-CM

## 2019-10-17 ENCOUNTER — HOSPITAL ENCOUNTER (OUTPATIENT)
Dept: PAIN MANAGEMENT | Age: 59
Discharge: HOME | End: 2019-10-17
Attending: ANESTHESIOLOGY

## 2019-10-17 ENCOUNTER — HOSPITAL ENCOUNTER (OUTPATIENT)
Dept: GENERAL RADIOLOGY | Age: 59
Discharge: HOME | End: 2019-10-17
Attending: ANESTHESIOLOGY

## 2019-10-17 VITALS
TEMPERATURE: 98.1 F | OXYGEN SATURATION: 96 % | DIASTOLIC BLOOD PRESSURE: 73 MMHG | RESPIRATION RATE: 16 BRPM | HEART RATE: 83 BPM | SYSTOLIC BLOOD PRESSURE: 180 MMHG

## 2019-10-17 DIAGNOSIS — M54.16 LUMBAR RADICULOPATHY: ICD-10-CM

## 2019-10-17 DIAGNOSIS — R52 PAIN: ICD-10-CM

## 2019-10-17 PROCEDURE — 10002801 HB RX 250 W/O HCPCS: Performed by: ANESTHESIOLOGY

## 2019-10-17 PROCEDURE — 13000067 HB PAIN MANAGEMENT GROUP 2

## 2019-10-17 PROCEDURE — 62323 NJX INTERLAMINAR LMBR/SAC: CPT | Performed by: ANESTHESIOLOGY

## 2019-10-17 PROCEDURE — 10002805 HB CONTRAST AGENT: Performed by: ANESTHESIOLOGY

## 2019-10-17 RX ORDER — HYDROCODONE BITARTRATE AND ACETAMINOPHEN 10; 325 MG/1; MG/1
TABLET ORAL
Qty: 40 TABLET | Refills: 0 | Status: SHIPPED | OUTPATIENT
Start: 2019-10-17 | End: 2019-12-03 | Stop reason: SDUPTHER

## 2019-10-17 RX ORDER — METHYLPREDNISOLONE ACETATE 80 MG/ML
80 INJECTION, SUSPENSION INTRA-ARTICULAR; INTRALESIONAL; INTRAMUSCULAR; SOFT TISSUE ONCE
Status: COMPLETED | OUTPATIENT
Start: 2019-10-17 | End: 2019-10-17

## 2019-10-17 RX ORDER — BUPIVACAINE HYDROCHLORIDE 2.5 MG/ML
2 INJECTION, SOLUTION EPIDURAL; INFILTRATION; INTRACAUDAL ONCE
Status: COMPLETED | OUTPATIENT
Start: 2019-10-17 | End: 2019-10-17

## 2019-10-17 RX ADMIN — IOHEXOL 0.5 ML: 300 INJECTION, SOLUTION INTRAVENOUS at 11:20

## 2019-10-17 RX ADMIN — METHYLPREDNISOLONE ACETATE 80 MG: 80 INJECTION, SUSPENSION INTRA-ARTICULAR; INTRALESIONAL; INTRAMUSCULAR; SOFT TISSUE at 11:21

## 2019-10-17 RX ADMIN — BUPIVACAINE HYDROCHLORIDE 5 MG: 2.5 INJECTION, SOLUTION EPIDURAL; INFILTRATION; INTRACAUDAL; PERINEURAL at 11:21

## 2019-10-17 ASSESSMENT — PAIN SCALES - GENERAL
PAINLEVEL_OUTOF10: 1
PAINLEVEL_OUTOF10: 8

## 2019-10-21 ENCOUNTER — TELEPHONE (OUTPATIENT)
Dept: PAIN MANAGEMENT | Age: 59
End: 2019-10-21

## 2019-10-28 ENCOUNTER — HOSPITAL ENCOUNTER (OUTPATIENT)
Dept: REHABILITATION | Age: 59
Discharge: STILL ADMITTED OR ADMINISTRATIVE | End: 2019-10-28
Attending: PHYSICIAN ASSISTANT

## 2019-10-28 DIAGNOSIS — M48.062 SPINAL STENOSIS OF LUMBAR REGION WITH NEUROGENIC CLAUDICATION: ICD-10-CM

## 2019-10-28 PROCEDURE — 97110 THERAPEUTIC EXERCISES: CPT | Performed by: PHYSICAL THERAPIST

## 2019-10-28 PROCEDURE — 97161 PT EVAL LOW COMPLEX 20 MIN: CPT | Performed by: PHYSICAL THERAPIST

## 2019-10-28 ASSESSMENT — ENCOUNTER SYMPTOMS
PAIN SCALE AT LOWEST: 0
PAIN SCALE AT HIGHEST: 6
QUALITY: ACHE
PAIN SEVERITY NOW: 3
QUALITY: SHARP
SUBJECTIVE PAIN PROGRESSION: IMPROVED

## 2019-10-29 ENCOUNTER — APPOINTMENT (OUTPATIENT)
Dept: REHABILITATION | Age: 59
End: 2019-10-29

## 2019-10-31 ENCOUNTER — PREP FOR CASE (OUTPATIENT)
Dept: PAIN MANAGEMENT | Age: 59
End: 2019-10-31

## 2019-10-31 ENCOUNTER — OFFICE VISIT (OUTPATIENT)
Dept: PAIN MANAGEMENT | Age: 59
End: 2019-10-31

## 2019-10-31 DIAGNOSIS — M54.16 LUMBAR RADICULOPATHY: ICD-10-CM

## 2019-10-31 DIAGNOSIS — M54.16 LUMBAR RADICULOPATHY: Primary | ICD-10-CM

## 2019-10-31 DIAGNOSIS — M48.062 SPINAL STENOSIS OF LUMBAR REGION WITH NEUROGENIC CLAUDICATION: Primary | ICD-10-CM

## 2019-10-31 PROCEDURE — 99214 OFFICE O/P EST MOD 30 MIN: CPT | Performed by: PHYSICIAN ASSISTANT

## 2019-10-31 RX ORDER — ERGOCALCIFEROL 1.25 MG/1
CAPSULE ORAL
Refills: 3 | Status: ON HOLD | COMMUNITY
Start: 2019-10-22 | End: 2023-05-23 | Stop reason: HOSPADM

## 2019-10-31 RX ORDER — NABUMETONE 750 MG/1
750 TABLET, FILM COATED ORAL 2 TIMES DAILY
Qty: 60 TABLET | Refills: 2 | Status: ON HOLD | OUTPATIENT
Start: 2019-10-31 | End: 2020-03-11 | Stop reason: HOSPADM

## 2019-10-31 RX ORDER — VALACYCLOVIR HYDROCHLORIDE 1 G/1
TABLET, FILM COATED ORAL
Refills: 0 | Status: ON HOLD | COMMUNITY
Start: 2019-10-04 | End: 2023-05-23 | Stop reason: HOSPADM

## 2019-10-31 ASSESSMENT — PAIN SCALES - GENERAL: PAINLEVEL: 7-8

## 2019-11-05 ENCOUNTER — HOSPITAL ENCOUNTER (OUTPATIENT)
Dept: REHABILITATION | Age: 59
Discharge: STILL ADMITTED OR ADMINISTRATIVE | End: 2019-11-05
Attending: PHYSICIAN ASSISTANT

## 2019-11-05 DIAGNOSIS — M48.062 SPINAL STENOSIS OF LUMBAR REGION WITH NEUROGENIC CLAUDICATION: ICD-10-CM

## 2019-11-05 PROCEDURE — 97110 THERAPEUTIC EXERCISES: CPT | Performed by: PHYSICAL THERAPIST

## 2019-11-05 PROCEDURE — 97140 MANUAL THERAPY 1/> REGIONS: CPT | Performed by: PHYSICAL THERAPIST

## 2019-11-11 ENCOUNTER — TELEPHONE (OUTPATIENT)
Dept: PAIN MANAGEMENT | Age: 59
End: 2019-11-11

## 2019-11-12 ENCOUNTER — HOSPITAL ENCOUNTER (OUTPATIENT)
Dept: REHABILITATION | Age: 59
Discharge: STILL ADMITTED OR ADMINISTRATIVE | End: 2019-11-12
Attending: PHYSICIAN ASSISTANT

## 2019-11-12 DIAGNOSIS — M48.062 SPINAL STENOSIS OF LUMBAR REGION WITH NEUROGENIC CLAUDICATION: ICD-10-CM

## 2019-11-12 PROCEDURE — 97140 MANUAL THERAPY 1/> REGIONS: CPT | Performed by: PHYSICAL THERAPIST

## 2019-11-12 PROCEDURE — 97110 THERAPEUTIC EXERCISES: CPT | Performed by: PHYSICAL THERAPIST

## 2019-11-14 ENCOUNTER — HOSPITAL ENCOUNTER (OUTPATIENT)
Dept: PAIN MANAGEMENT | Age: 59
Discharge: HOME | End: 2019-11-14
Attending: ANESTHESIOLOGY

## 2019-11-14 ENCOUNTER — HOSPITAL ENCOUNTER (OUTPATIENT)
Dept: GENERAL RADIOLOGY | Age: 59
Discharge: HOME | End: 2019-11-14
Attending: ANESTHESIOLOGY

## 2019-11-14 VITALS
TEMPERATURE: 97.9 F | DIASTOLIC BLOOD PRESSURE: 74 MMHG | OXYGEN SATURATION: 96 % | SYSTOLIC BLOOD PRESSURE: 170 MMHG | RESPIRATION RATE: 16 BRPM | HEART RATE: 68 BPM

## 2019-11-14 DIAGNOSIS — R52 PAIN: ICD-10-CM

## 2019-11-14 DIAGNOSIS — M54.16 LUMBAR RADICULOPATHY: ICD-10-CM

## 2019-11-14 PROCEDURE — 13000067 HB PAIN MANAGEMENT GROUP 2

## 2019-11-14 PROCEDURE — 10002801 HB RX 250 W/O HCPCS: Performed by: ANESTHESIOLOGY

## 2019-11-14 PROCEDURE — 10002805 HB CONTRAST AGENT: Performed by: ANESTHESIOLOGY

## 2019-11-14 PROCEDURE — 62323 NJX INTERLAMINAR LMBR/SAC: CPT | Performed by: ANESTHESIOLOGY

## 2019-11-14 RX ORDER — METHYLPREDNISOLONE ACETATE 80 MG/ML
80 INJECTION, SUSPENSION INTRA-ARTICULAR; INTRALESIONAL; INTRAMUSCULAR; SOFT TISSUE ONCE
Status: COMPLETED | OUTPATIENT
Start: 2019-11-14 | End: 2019-11-14

## 2019-11-14 RX ORDER — BUPIVACAINE HYDROCHLORIDE 2.5 MG/ML
3 INJECTION, SOLUTION EPIDURAL; INFILTRATION; INTRACAUDAL ONCE
Status: COMPLETED | OUTPATIENT
Start: 2019-11-14 | End: 2019-11-14

## 2019-11-14 RX ADMIN — METHYLPREDNISOLONE ACETATE 80 MG: 80 INJECTION, SUSPENSION INTRA-ARTICULAR; INTRALESIONAL; INTRAMUSCULAR; SOFT TISSUE at 16:28

## 2019-11-14 RX ADMIN — IOHEXOL 0.5 ML: 300 INJECTION, SOLUTION INTRAVENOUS at 16:28

## 2019-11-14 RX ADMIN — BUPIVACAINE HYDROCHLORIDE 3 ML: 2.5 INJECTION, SOLUTION EPIDURAL; INFILTRATION; INTRACAUDAL; PERINEURAL at 16:29

## 2019-11-14 ASSESSMENT — PAIN SCALES - GENERAL
PAINLEVEL_OUTOF10: 0
PAINLEVEL_OUTOF10: 7

## 2019-11-15 ENCOUNTER — TELEPHONE (OUTPATIENT)
Dept: SCHEDULING | Age: 59
End: 2019-11-15

## 2019-11-19 ENCOUNTER — APPOINTMENT (OUTPATIENT)
Dept: REHABILITATION | Age: 59
End: 2019-11-19

## 2019-11-26 ENCOUNTER — APPOINTMENT (OUTPATIENT)
Dept: REHABILITATION | Age: 59
End: 2019-11-26
Attending: PHYSICIAN ASSISTANT

## 2019-12-03 ENCOUNTER — OFFICE VISIT (OUTPATIENT)
Dept: PAIN MANAGEMENT | Age: 59
End: 2019-12-03

## 2019-12-03 DIAGNOSIS — M48.062 SPINAL STENOSIS OF LUMBAR REGION WITH NEUROGENIC CLAUDICATION: Primary | ICD-10-CM

## 2019-12-03 PROCEDURE — 99213 OFFICE O/P EST LOW 20 MIN: CPT | Performed by: PHYSICIAN ASSISTANT

## 2019-12-03 RX ORDER — GABAPENTIN 300 MG/1
CAPSULE ORAL
Qty: 90 CAPSULE | Refills: 1 | Status: SHIPPED | OUTPATIENT
Start: 2019-12-03 | End: 2020-02-07 | Stop reason: SDUPTHER

## 2019-12-03 RX ORDER — HYDROCODONE BITARTRATE AND ACETAMINOPHEN 10; 325 MG/1; MG/1
TABLET ORAL
Qty: 60 TABLET | Refills: 0 | Status: SHIPPED | OUTPATIENT
Start: 2019-12-03 | End: 2020-01-23 | Stop reason: SDUPTHER

## 2019-12-03 ASSESSMENT — PAIN SCALES - GENERAL: PAINLEVEL: 5-6

## 2019-12-24 ENCOUNTER — CLINICAL ABSTRACT (OUTPATIENT)
Dept: HEALTH INFORMATION MANAGEMENT | Age: 59
End: 2019-12-24

## 2020-01-24 RX ORDER — HYDROCODONE BITARTRATE AND ACETAMINOPHEN 10; 325 MG/1; MG/1
TABLET ORAL
Qty: 60 TABLET | Refills: 0 | Status: SHIPPED | OUTPATIENT
Start: 2020-01-24 | End: 2020-01-27 | Stop reason: SDUPTHER

## 2020-01-25 ENCOUNTER — TELEPHONE (OUTPATIENT)
Dept: SCHEDULING | Age: 60
End: 2020-01-25

## 2020-01-27 RX ORDER — HYDROCODONE BITARTRATE AND ACETAMINOPHEN 10; 325 MG/1; MG/1
TABLET ORAL
Qty: 60 TABLET | Refills: 0 | Status: SHIPPED | OUTPATIENT
Start: 2020-01-27 | End: 2020-01-30 | Stop reason: SDUPTHER

## 2020-01-31 RX ORDER — HYDROCODONE BITARTRATE AND ACETAMINOPHEN 10; 325 MG/1; MG/1
TABLET ORAL
Qty: 46 TABLET | Refills: 0 | Status: ON HOLD | OUTPATIENT
Start: 2020-01-31 | End: 2020-03-11 | Stop reason: HOSPADM

## 2020-02-07 RX ORDER — GABAPENTIN 300 MG/1
CAPSULE ORAL
Qty: 90 CAPSULE | Refills: 1 | Status: ON HOLD | OUTPATIENT
Start: 2020-02-07 | End: 2020-03-11 | Stop reason: HOSPADM

## 2020-03-06 RX ORDER — ALBUTEROL SULFATE 90 UG/1
2 AEROSOL, METERED RESPIRATORY (INHALATION) EVERY 4 HOURS PRN
COMMUNITY

## 2020-03-06 RX ORDER — TRAMADOL HYDROCHLORIDE 50 MG/1
50 TABLET ORAL 2 TIMES DAILY
COMMUNITY
End: 2020-06-25 | Stop reason: SDUPTHER

## 2020-03-06 ASSESSMENT — ACTIVITIES OF DAILY LIVING (ADL)
ADL_SCORE: 12
HISTORY OF FALLING IN THE LAST YEAR (PRIOR TO ADMISSION): NO
ADL_BEFORE_ADMISSION: INDEPENDENT
NEEDS_ASSIST: NO
SENSORY_SUPPORT_DEVICES: EYEGLASSES

## 2020-03-10 ENCOUNTER — ANESTHESIA EVENT (OUTPATIENT)
Dept: SURGERY | Age: 60
End: 2020-03-10

## 2020-03-11 ENCOUNTER — HOSPITAL ENCOUNTER (OUTPATIENT)
Age: 60
Discharge: HOME | End: 2020-03-11
Attending: NEUROLOGICAL SURGERY | Admitting: NEUROLOGICAL SURGERY

## 2020-03-11 ENCOUNTER — APPOINTMENT (OUTPATIENT)
Dept: GENERAL RADIOLOGY | Age: 60
End: 2020-03-11
Attending: NEUROLOGICAL SURGERY

## 2020-03-11 ENCOUNTER — ANESTHESIA (OUTPATIENT)
Dept: SURGERY | Age: 60
End: 2020-03-11

## 2020-03-11 VITALS
HEIGHT: 69 IN | WEIGHT: 293 LBS | TEMPERATURE: 98.4 F | BODY MASS INDEX: 43.4 KG/M2 | RESPIRATION RATE: 16 BRPM | DIASTOLIC BLOOD PRESSURE: 76 MMHG | HEART RATE: 56 BPM | SYSTOLIC BLOOD PRESSURE: 122 MMHG | OXYGEN SATURATION: 97 %

## 2020-03-11 PROCEDURE — 10002800 HB RX 250 W HCPCS: Performed by: ANESTHESIOLOGY

## 2020-03-11 PROCEDURE — 13000110 HB NEURO COMPLEX CASE S/U + 1ST 15 MIN: Performed by: NEUROLOGICAL SURGERY

## 2020-03-11 PROCEDURE — 10006027 HB SUPPLY 278: Performed by: NEUROLOGICAL SURGERY

## 2020-03-11 PROCEDURE — 76000 FLUOROSCOPY <1 HR PHYS/QHP: CPT

## 2020-03-11 PROCEDURE — 13000002 HB ANESTHESIA  GENERAL  S/U + 1ST 15 MIN: Performed by: NEUROLOGICAL SURGERY

## 2020-03-11 PROCEDURE — 10004452 HB PACU ADDL 30 MINUTES: Performed by: NEUROLOGICAL SURGERY

## 2020-03-11 PROCEDURE — 10002803 HB RX 637: Performed by: PHYSICIAN ASSISTANT

## 2020-03-11 PROCEDURE — 10002801 HB RX 250 W/O HCPCS: Performed by: NEUROLOGICAL SURGERY

## 2020-03-11 PROCEDURE — 13000001 HB PHASE II RECOVERY EA 30 MINUTES: Performed by: NEUROLOGICAL SURGERY

## 2020-03-11 PROCEDURE — 13000111 HB NEURO COMPLEX CASE EA ADD MINUTE: Performed by: NEUROLOGICAL SURGERY

## 2020-03-11 PROCEDURE — 13000003 HB ANESTHESIA  GENERAL EA ADD MINUTE: Performed by: NEUROLOGICAL SURGERY

## 2020-03-11 PROCEDURE — 10002800 HB RX 250 W HCPCS: Performed by: NURSE ANESTHETIST, CERTIFIED REGISTERED

## 2020-03-11 PROCEDURE — 10006023 HB SUPPLY 272: Performed by: NEUROLOGICAL SURGERY

## 2020-03-11 PROCEDURE — 10004451 HB PACU RECOVERY 1ST 30 MINUTES: Performed by: NEUROLOGICAL SURGERY

## 2020-03-11 PROCEDURE — 10002807 HB RX 258: Performed by: NURSE ANESTHETIST, CERTIFIED REGISTERED

## 2020-03-11 PROCEDURE — 10002803 HB RX 637: Performed by: NURSE ANESTHETIST, CERTIFIED REGISTERED

## 2020-03-11 PROCEDURE — 10002801 HB RX 250 W/O HCPCS: Performed by: NURSE ANESTHETIST, CERTIFIED REGISTERED

## 2020-03-11 PROCEDURE — 10002800 HB RX 250 W HCPCS: Performed by: NEUROLOGICAL SURGERY

## 2020-03-11 DEVICE — FLOSEAL HEMOSTATIC MATRIX, 5 ML
Type: IMPLANTABLE DEVICE | Site: BACK | Status: FUNCTIONAL
Brand: FLOSEAL

## 2020-03-11 RX ORDER — LIDOCAINE HYDROCHLORIDE 10 MG/ML
INJECTION, SOLUTION INFILTRATION; PERINEURAL PRN
Status: DISCONTINUED | OUTPATIENT
Start: 2020-03-11 | End: 2020-03-11

## 2020-03-11 RX ORDER — SODIUM CHLORIDE, SODIUM LACTATE, POTASSIUM CHLORIDE, CALCIUM CHLORIDE 600; 310; 30; 20 MG/100ML; MG/100ML; MG/100ML; MG/100ML
INJECTION, SOLUTION INTRAVENOUS CONTINUOUS PRN
Status: DISCONTINUED | OUTPATIENT
Start: 2020-03-11 | End: 2020-03-11

## 2020-03-11 RX ORDER — PROPOFOL 10 MG/ML
INJECTION, EMULSION INTRAVENOUS PRN
Status: DISCONTINUED | OUTPATIENT
Start: 2020-03-11 | End: 2020-03-11

## 2020-03-11 RX ORDER — SODIUM CHLORIDE 9 MG/ML
INJECTION, SOLUTION INTRAVENOUS CONTINUOUS
Status: DISCONTINUED | OUTPATIENT
Start: 2020-03-11 | End: 2020-03-11 | Stop reason: HOSPADM

## 2020-03-11 RX ORDER — ONDANSETRON 2 MG/ML
4 INJECTION INTRAMUSCULAR; INTRAVENOUS EVERY 8 HOURS PRN
Status: DISCONTINUED | OUTPATIENT
Start: 2020-03-11 | End: 2020-03-11 | Stop reason: HOSPADM

## 2020-03-11 RX ORDER — MIDAZOLAM HYDROCHLORIDE 1 MG/ML
2 INJECTION, SOLUTION INTRAMUSCULAR; INTRAVENOUS
Status: DISCONTINUED | OUTPATIENT
Start: 2020-03-11 | End: 2020-03-11 | Stop reason: HOSPADM

## 2020-03-11 RX ORDER — SODIUM CHLORIDE, SODIUM LACTATE, POTASSIUM CHLORIDE, CALCIUM CHLORIDE 600; 310; 30; 20 MG/100ML; MG/100ML; MG/100ML; MG/100ML
INJECTION, SOLUTION INTRAVENOUS CONTINUOUS
Status: DISCONTINUED | OUTPATIENT
Start: 2020-03-11 | End: 2020-03-11 | Stop reason: HOSPADM

## 2020-03-11 RX ORDER — DEXAMETHASONE SODIUM PHOSPHATE 4 MG/ML
INJECTION, SOLUTION INTRA-ARTICULAR; INTRALESIONAL; INTRAMUSCULAR; INTRAVENOUS; SOFT TISSUE PRN
Status: DISCONTINUED | OUTPATIENT
Start: 2020-03-11 | End: 2020-03-11

## 2020-03-11 RX ORDER — HYDROCODONE BITARTRATE AND ACETAMINOPHEN 10; 325 MG/1; MG/1
1 TABLET ORAL EVERY 4 HOURS PRN
Status: DISCONTINUED | OUTPATIENT
Start: 2020-03-11 | End: 2020-03-11 | Stop reason: HOSPADM

## 2020-03-11 RX ORDER — ALBUTEROL SULFATE 2.5 MG/3ML
5 SOLUTION RESPIRATORY (INHALATION) ONCE
Status: DISCONTINUED | OUTPATIENT
Start: 2020-03-11 | End: 2020-03-11 | Stop reason: HOSPADM

## 2020-03-11 RX ORDER — DEXTROSE MONOHYDRATE 25 G/50ML
25 INJECTION, SOLUTION INTRAVENOUS PRN
Status: DISCONTINUED | OUTPATIENT
Start: 2020-03-11 | End: 2020-03-11 | Stop reason: HOSPADM

## 2020-03-11 RX ORDER — NEOSTIGMINE METHYLSULFATE 4 MG/4 ML
SYRINGE (ML) INTRAVENOUS PRN
Status: DISCONTINUED | OUTPATIENT
Start: 2020-03-11 | End: 2020-03-11

## 2020-03-11 RX ORDER — LIDOCAINE HYDROCHLORIDE AND EPINEPHRINE 10; 10 MG/ML; UG/ML
INJECTION, SOLUTION INFILTRATION; PERINEURAL PRN
Status: DISCONTINUED | OUTPATIENT
Start: 2020-03-11 | End: 2020-03-11 | Stop reason: HOSPADM

## 2020-03-11 RX ORDER — SUFENTANIL CITRATE 50 UG/ML
INJECTION EPIDURAL; INTRAVENOUS PRN
Status: DISCONTINUED | OUTPATIENT
Start: 2020-03-11 | End: 2020-03-11

## 2020-03-11 RX ORDER — DEXTROSE MONOHYDRATE 50 MG/ML
INJECTION, SOLUTION INTRAVENOUS CONTINUOUS PRN
Status: DISCONTINUED | OUTPATIENT
Start: 2020-03-11 | End: 2020-03-11 | Stop reason: HOSPADM

## 2020-03-11 RX ORDER — GLYCOPYRROLATE 0.2 MG/ML
INJECTION, SOLUTION INTRAMUSCULAR; INTRAVENOUS PRN
Status: DISCONTINUED | OUTPATIENT
Start: 2020-03-11 | End: 2020-03-11

## 2020-03-11 RX ORDER — ACETAMINOPHEN 500 MG
500 TABLET ORAL EVERY 4 HOURS PRN
Status: DISCONTINUED | OUTPATIENT
Start: 2020-03-11 | End: 2020-03-11 | Stop reason: HOSPADM

## 2020-03-11 RX ORDER — ROCURONIUM BROMIDE 10 MG/ML
INJECTION, SOLUTION INTRAVENOUS PRN
Status: DISCONTINUED | OUTPATIENT
Start: 2020-03-11 | End: 2020-03-11

## 2020-03-11 RX ORDER — ALBUTEROL SULFATE 90 UG/1
AEROSOL, METERED RESPIRATORY (INHALATION) PRN
Status: DISCONTINUED | OUTPATIENT
Start: 2020-03-11 | End: 2020-03-11

## 2020-03-11 RX ORDER — MIDAZOLAM HYDROCHLORIDE 1 MG/ML
INJECTION, SOLUTION INTRAMUSCULAR; INTRAVENOUS PRN
Status: DISCONTINUED | OUTPATIENT
Start: 2020-03-11 | End: 2020-03-11

## 2020-03-11 RX ORDER — HYDROCODONE BITARTRATE AND ACETAMINOPHEN 10; 325 MG/1; MG/1
2 TABLET ORAL EVERY 4 HOURS PRN
Status: DISCONTINUED | OUTPATIENT
Start: 2020-03-11 | End: 2020-03-11 | Stop reason: HOSPADM

## 2020-03-11 RX ADMIN — HYDROCODONE BITARTRATE AND ACETAMINOPHEN 2 TABLET: 10; 325 TABLET ORAL at 12:26

## 2020-03-11 RX ADMIN — SODIUM CHLORIDE, POTASSIUM CHLORIDE, SODIUM LACTATE AND CALCIUM CHLORIDE: 600; 310; 30; 20 INJECTION, SOLUTION INTRAVENOUS at 08:24

## 2020-03-11 RX ADMIN — LIDOCAINE HYDROCHLORIDE 5 ML: 10 INJECTION, SOLUTION INFILTRATION; PERINEURAL at 08:25

## 2020-03-11 RX ADMIN — HYDROMORPHONE HYDROCHLORIDE 0.5 MG: 1 INJECTION, SOLUTION INTRAMUSCULAR; INTRAVENOUS; SUBCUTANEOUS at 11:30

## 2020-03-11 RX ADMIN — PROPOFOL 150 MG: 10 INJECTION, EMULSION INTRAVENOUS at 08:27

## 2020-03-11 RX ADMIN — ROCURONIUM BROMIDE 30 MG: 10 INJECTION INTRAVENOUS at 08:28

## 2020-03-11 RX ADMIN — DEXAMETHASONE SODIUM PHOSPHATE 4 MG: 4 INJECTION, SOLUTION INTRAMUSCULAR; INTRAVENOUS at 08:27

## 2020-03-11 RX ADMIN — LIDOCAINE HYDROCHLORIDE 5 ML: 10 INJECTION, SOLUTION INFILTRATION; PERINEURAL at 08:26

## 2020-03-11 RX ADMIN — ALBUTEROL SULFATE 4 PUFF: 90 AEROSOL, METERED RESPIRATORY (INHALATION) at 10:56

## 2020-03-11 RX ADMIN — MIDAZOLAM HYDROCHLORIDE 2 MG: 1 INJECTION, SOLUTION INTRAMUSCULAR; INTRAVENOUS at 08:20

## 2020-03-11 RX ADMIN — PROPOFOL 50 MG: 10 INJECTION, EMULSION INTRAVENOUS at 08:26

## 2020-03-11 RX ADMIN — GLYCOPYRROLATE 0.3 MG: 1 INJECTION INTRAMUSCULAR; INTRAVENOUS at 10:38

## 2020-03-11 RX ADMIN — DEXAMETHASONE SODIUM PHOSPHATE 4 MG: 4 INJECTION, SOLUTION INTRAMUSCULAR; INTRAVENOUS at 08:32

## 2020-03-11 RX ADMIN — PROPOFOL 50 MCG/KG/MIN: 10 INJECTION, EMULSION INTRAVENOUS at 08:38

## 2020-03-11 RX ADMIN — SUFENTANIL CITRATE 30 MCG: 50 INJECTION EPIDURAL; INTRAVENOUS at 08:25

## 2020-03-11 RX ADMIN — ALBUTEROL SULFATE 4 PUFF: 90 AEROSOL, METERED RESPIRATORY (INHALATION) at 10:49

## 2020-03-11 RX ADMIN — SUFENTANIL CITRATE 0.25 MCG/KG/HR: 50 INJECTION EPIDURAL; INTRAVENOUS at 08:40

## 2020-03-11 RX ADMIN — ALBUTEROL SULFATE 8 PUFF: 90 AEROSOL, METERED RESPIRATORY (INHALATION) at 10:42

## 2020-03-11 RX ADMIN — NEOSTIGMINE METHYLSULFATE 2 MG: 1 INJECTION, SOLUTION INTRAVENOUS at 10:40

## 2020-03-11 RX ADMIN — Medication 3000 MG: at 08:30

## 2020-03-11 RX ADMIN — LIDOCAINE HYDROCHLORIDE 5 ML: 10 INJECTION, SOLUTION INFILTRATION; PERINEURAL at 10:49

## 2020-03-11 ASSESSMENT — PAIN SCALES - GENERAL
PAINLEVEL_OUTOF10: 5
PAINLEVEL_OUTOF10: 8
PAINLEVEL_OUTOF10: 2

## 2020-06-25 ENCOUNTER — OFFICE VISIT (OUTPATIENT)
Dept: PAIN MANAGEMENT | Age: 60
End: 2020-06-25

## 2020-06-25 ENCOUNTER — HOSPITAL ENCOUNTER (OUTPATIENT)
Dept: GENERAL RADIOLOGY | Age: 60
Discharge: HOME | End: 2020-06-25
Attending: PHYSICIAN ASSISTANT

## 2020-06-25 DIAGNOSIS — M25.561 ACUTE PAIN OF RIGHT KNEE: ICD-10-CM

## 2020-06-25 DIAGNOSIS — G89.4 CHRONIC PAIN SYNDROME: Primary | ICD-10-CM

## 2020-06-25 PROCEDURE — 99214 OFFICE O/P EST MOD 30 MIN: CPT | Performed by: PHYSICIAN ASSISTANT

## 2020-06-25 PROCEDURE — 73560 X-RAY EXAM OF KNEE 1 OR 2: CPT

## 2020-06-25 RX ORDER — TRAZODONE HYDROCHLORIDE 50 MG/1
50 TABLET ORAL NIGHTLY
COMMUNITY
End: 2022-03-11

## 2020-06-25 RX ORDER — TRAMADOL HYDROCHLORIDE 50 MG/1
TABLET ORAL
Qty: 75 TABLET | Refills: 0 | Status: SHIPPED | OUTPATIENT
Start: 2020-06-25 | End: 2022-03-11

## 2020-06-25 ASSESSMENT — PAIN SCALES - GENERAL: PAINLEVEL: 9-10

## 2020-07-01 PROBLEM — G89.4 CHRONIC PAIN SYNDROME: Status: ACTIVE | Noted: 2020-07-01

## 2020-07-06 ENCOUNTER — TELEPHONE (OUTPATIENT)
Dept: SCHEDULING | Age: 60
End: 2020-07-06

## 2021-05-26 VITALS
DIASTOLIC BLOOD PRESSURE: 75 MMHG | BODY MASS INDEX: 41.02 KG/M2 | WEIGHT: 293 LBS | HEIGHT: 71 IN | SYSTOLIC BLOOD PRESSURE: 137 MMHG | WEIGHT: 293 LBS | BODY MASS INDEX: 43.4 KG/M2 | WEIGHT: 293 LBS | BODY MASS INDEX: 41.02 KG/M2 | RESPIRATION RATE: 14 BRPM | OXYGEN SATURATION: 96 % | WEIGHT: 293 LBS | HEIGHT: 69 IN | BODY MASS INDEX: 43.4 KG/M2 | BODY MASS INDEX: 49.58 KG/M2 | DIASTOLIC BLOOD PRESSURE: 61 MMHG | HEART RATE: 75 BPM | SYSTOLIC BLOOD PRESSURE: 176 MMHG | OXYGEN SATURATION: 96 % | HEIGHT: 69 IN | SYSTOLIC BLOOD PRESSURE: 149 MMHG | BODY MASS INDEX: 43.4 KG/M2 | DIASTOLIC BLOOD PRESSURE: 97 MMHG | WEIGHT: 293 LBS | DIASTOLIC BLOOD PRESSURE: 72 MMHG | OXYGEN SATURATION: 97 % | SYSTOLIC BLOOD PRESSURE: 149 MMHG | SYSTOLIC BLOOD PRESSURE: 161 MMHG | HEART RATE: 82 BPM | HEART RATE: 69 BPM | WEIGHT: 293 LBS | HEART RATE: 71 BPM | HEIGHT: 69 IN | SYSTOLIC BLOOD PRESSURE: 138 MMHG | DIASTOLIC BLOOD PRESSURE: 74 MMHG | HEART RATE: 81 BPM | DIASTOLIC BLOOD PRESSURE: 89 MMHG | HEART RATE: 90 BPM | HEIGHT: 71 IN

## 2021-06-14 ENCOUNTER — LAB REQUISITION (OUTPATIENT)
Dept: LAB | Age: 61
End: 2021-06-14

## 2021-06-14 DIAGNOSIS — Z00.00 ENCOUNTER FOR GENERAL ADULT MEDICAL EXAMINATION WITHOUT ABNORMAL FINDINGS: ICD-10-CM

## 2021-06-14 DIAGNOSIS — E11.65 TYPE 2 DIABETES MELLITUS WITH HYPERGLYCEMIA (CMD): ICD-10-CM

## 2021-06-14 DIAGNOSIS — M47.16 OTHER SPONDYLOSIS WITH MYELOPATHY, LUMBAR REGION: ICD-10-CM

## 2021-06-14 DIAGNOSIS — G47.33 OBSTRUCTIVE SLEEP APNEA (ADULT) (PEDIATRIC): ICD-10-CM

## 2021-06-14 PROCEDURE — 80061 LIPID PANEL: CPT | Performed by: CLINICAL MEDICAL LABORATORY

## 2021-06-14 PROCEDURE — 80053 COMPREHEN METABOLIC PANEL: CPT | Performed by: CLINICAL MEDICAL LABORATORY

## 2021-06-14 PROCEDURE — 82043 UR ALBUMIN QUANTITATIVE: CPT | Performed by: CLINICAL MEDICAL LABORATORY

## 2021-06-14 PROCEDURE — 82570 ASSAY OF URINE CREATININE: CPT | Performed by: CLINICAL MEDICAL LABORATORY

## 2021-06-14 PROCEDURE — 85025 COMPLETE CBC W/AUTO DIFF WBC: CPT | Performed by: CLINICAL MEDICAL LABORATORY

## 2021-06-14 PROCEDURE — 83036 HEMOGLOBIN GLYCOSYLATED A1C: CPT | Performed by: CLINICAL MEDICAL LABORATORY

## 2021-06-15 LAB
ALBUMIN SERPL-MCNC: 3.6 G/DL (ref 3.6–5.1)
ALBUMIN/GLOB SERPL: 1.3 {RATIO} (ref 1–2.4)
ALP SERPL-CCNC: 82 UNITS/L (ref 45–117)
ALT SERPL-CCNC: 23 UNITS/L
ANION GAP SERPL CALC-SCNC: 10 MMOL/L (ref 10–20)
AST SERPL-CCNC: 15 UNITS/L
BASOPHILS # BLD: 0 K/MCL (ref 0–0.3)
BASOPHILS NFR BLD: 1 %
BILIRUB SERPL-MCNC: 0.4 MG/DL (ref 0.2–1)
BUN SERPL-MCNC: 14 MG/DL (ref 6–20)
BUN/CREAT SERPL: 23 (ref 7–25)
CALCIUM SERPL-MCNC: 8.6 MG/DL (ref 8.4–10.2)
CHLORIDE SERPL-SCNC: 111 MMOL/L (ref 98–107)
CHOLEST SERPL-MCNC: 166 MG/DL
CHOLEST/HDLC SERPL: 3.8 {RATIO}
CO2 SERPL-SCNC: 25 MMOL/L (ref 21–32)
CREAT SERPL-MCNC: 0.61 MG/DL (ref 0.51–0.95)
CREAT UR-MCNC: 25.2 MG/DL
DEPRECATED RDW RBC: 47.8 FL (ref 39–50)
EOSINOPHIL # BLD: 0.2 K/MCL (ref 0–0.5)
EOSINOPHIL NFR BLD: 3 %
ERYTHROCYTE [DISTWIDTH] IN BLOOD: 13.6 % (ref 11–15)
FASTING DURATION TIME PATIENT: 12 HOURS
FASTING DURATION TIME PATIENT: 12 HOURS
GFR SERPLBLD BASED ON 1.73 SQ M-ARVRAT: >90 ML/MIN/1.73M2
GLOBULIN SER-MCNC: 2.7 G/DL (ref 2–4)
GLUCOSE SERPL-MCNC: 123 MG/DL (ref 65–99)
HBA1C MFR BLD: 6.3 % (ref 4.5–5.6)
HCT VFR BLD CALC: 41.7 % (ref 36–46.5)
HDLC SERPL-MCNC: 44 MG/DL
HGB BLD-MCNC: 13.3 G/DL (ref 12–15.5)
IMM GRANULOCYTES # BLD AUTO: 0 K/MCL (ref 0–0.2)
IMM GRANULOCYTES # BLD: 0 %
LDLC SERPL CALC-MCNC: 94 MG/DL
LYMPHOCYTES # BLD: 1.4 K/MCL (ref 1–4)
LYMPHOCYTES NFR BLD: 30 %
MCH RBC QN AUTO: 30.4 PG (ref 26–34)
MCHC RBC AUTO-ENTMCNC: 31.9 G/DL (ref 32–36.5)
MCV RBC AUTO: 95.4 FL (ref 78–100)
MICROALBUMIN UR-MCNC: 0.76 MG/DL
MICROALBUMIN/CREAT UR: 30.2 MG/G
MONOCYTES # BLD: 0.4 K/MCL (ref 0.3–0.9)
MONOCYTES NFR BLD: 8 %
NEUTROPHILS # BLD: 2.6 K/MCL (ref 1.8–7.7)
NEUTROPHILS NFR BLD: 58 %
NONHDLC SERPL-MCNC: 122 MG/DL
NRBC BLD MANUAL-RTO: 0 /100 WBC
PLATELET # BLD AUTO: 184 K/MCL (ref 140–450)
POTASSIUM SERPL-SCNC: 4.3 MMOL/L (ref 3.4–5.1)
PROT SERPL-MCNC: 6.3 G/DL (ref 6.4–8.2)
RBC # BLD: 4.37 MIL/MCL (ref 4–5.2)
SODIUM SERPL-SCNC: 142 MMOL/L (ref 135–145)
TRIGL SERPL-MCNC: 141 MG/DL
WBC # BLD: 4.6 K/MCL (ref 4.2–11)

## 2021-11-05 DIAGNOSIS — M17.11 PRIMARY OSTEOARTHRITIS OF RIGHT KNEE: Primary | ICD-10-CM

## 2021-11-05 DIAGNOSIS — Z96.651 HISTORY OF TOTAL KNEE ARTHROPLASTY, RIGHT: ICD-10-CM

## 2021-11-18 ENCOUNTER — HOSPITAL ENCOUNTER (OUTPATIENT)
Dept: PET IMAGING | Age: 61
Discharge: HOME | End: 2021-11-18
Attending: ORTHOPAEDIC SURGERY

## 2021-11-18 DIAGNOSIS — Z96.651 HISTORY OF TOTAL KNEE ARTHROPLASTY, RIGHT: ICD-10-CM

## 2021-11-18 DIAGNOSIS — M17.11 PRIMARY OSTEOARTHRITIS OF RIGHT KNEE: ICD-10-CM

## 2021-11-18 PROCEDURE — G1004 CDSM NDSC: HCPCS

## 2021-11-18 PROCEDURE — A9503 TC99M MEDRONATE: HCPCS | Performed by: ORTHOPAEDIC SURGERY

## 2021-11-18 PROCEDURE — 78830 RP LOCLZJ TUM SPECT W/CT 1: CPT

## 2021-11-18 PROCEDURE — 10006150 HB RX 343: Performed by: ORTHOPAEDIC SURGERY

## 2021-11-18 RX ORDER — TC 99M MEDRONATE 20 MG/10ML
23.7 INJECTION, POWDER, LYOPHILIZED, FOR SOLUTION INTRAVENOUS ONCE
Status: COMPLETED | OUTPATIENT
Start: 2021-11-18 | End: 2021-11-18

## 2021-11-18 RX ADMIN — TC 99M MEDRONATE 23.7 MILLICURIE: 20 INJECTION, POWDER, LYOPHILIZED, FOR SOLUTION INTRAVENOUS at 12:05

## 2021-11-19 ENCOUNTER — APPOINTMENT (OUTPATIENT)
Dept: PET IMAGING | Age: 61
End: 2021-11-19
Attending: ORTHOPAEDIC SURGERY

## 2021-11-19 ENCOUNTER — HOSPITAL ENCOUNTER (OUTPATIENT)
Dept: PET IMAGING | Age: 61
End: 2021-11-19
Attending: ORTHOPAEDIC SURGERY

## 2022-02-09 ENCOUNTER — TELEPHONE (OUTPATIENT)
Dept: PAIN MANAGEMENT | Age: 62
End: 2022-02-09

## 2022-03-11 ENCOUNTER — OFFICE VISIT (OUTPATIENT)
Dept: PAIN MANAGEMENT | Age: 62
End: 2022-03-11

## 2022-03-11 ENCOUNTER — PREP FOR CASE (OUTPATIENT)
Dept: PAIN MANAGEMENT | Age: 62
End: 2022-03-11

## 2022-03-11 VITALS
DIASTOLIC BLOOD PRESSURE: 85 MMHG | HEIGHT: 69 IN | SYSTOLIC BLOOD PRESSURE: 185 MMHG | BODY MASS INDEX: 39.99 KG/M2 | HEART RATE: 63 BPM | WEIGHT: 270 LBS

## 2022-03-11 DIAGNOSIS — M54.16 LUMBAR RADICULOPATHY: Primary | ICD-10-CM

## 2022-03-11 PROCEDURE — 99214 OFFICE O/P EST MOD 30 MIN: CPT | Performed by: ANESTHESIOLOGY

## 2022-03-11 RX ORDER — NABUMETONE 750 MG/1
750 TABLET, FILM COATED ORAL 2 TIMES DAILY PRN
Qty: 60 TABLET | Refills: 1 | Status: SHIPPED | OUTPATIENT
Start: 2022-03-11 | End: 2022-05-05

## 2022-03-11 RX ORDER — CLONAZEPAM 0.5 MG/1
TABLET ORAL
Status: ON HOLD | COMMUNITY
Start: 2022-03-09 | End: 2023-05-23 | Stop reason: HOSPADM

## 2022-03-11 RX ORDER — VENLAFAXINE HYDROCHLORIDE 150 MG/1
75 CAPSULE, EXTENDED RELEASE ORAL EVERY MORNING
Status: ON HOLD | COMMUNITY
Start: 2022-02-03 | End: 2023-05-22

## 2022-03-11 RX ORDER — QUETIAPINE FUMARATE 100 MG/1
TABLET, FILM COATED ORAL
COMMUNITY
Start: 2022-02-10

## 2022-03-11 RX ORDER — NABUMETONE 750 MG/1
TABLET, FILM COATED ORAL
COMMUNITY
Start: 2022-01-24 | End: 2022-03-11 | Stop reason: SDUPTHER

## 2022-03-11 ASSESSMENT — PAIN SCALES - GENERAL: PAINLEVEL: 5

## 2022-03-11 ASSESSMENT — ENCOUNTER SYMPTOMS: BACK PAIN: 1

## 2022-03-29 ENCOUNTER — TELEPHONE (OUTPATIENT)
Dept: PAIN MANAGEMENT | Age: 62
End: 2022-03-29

## 2022-03-30 ENCOUNTER — HOSPITAL ENCOUNTER (OUTPATIENT)
Dept: PAIN MANAGEMENT | Age: 62
Discharge: HOME | End: 2022-03-30
Attending: ANESTHESIOLOGY

## 2022-03-30 ENCOUNTER — HOSPITAL ENCOUNTER (OUTPATIENT)
Dept: GENERAL RADIOLOGY | Age: 62
Discharge: HOME | End: 2022-03-30
Attending: ANESTHESIOLOGY

## 2022-03-30 VITALS
DIASTOLIC BLOOD PRESSURE: 77 MMHG | OXYGEN SATURATION: 98 % | HEART RATE: 69 BPM | RESPIRATION RATE: 16 BRPM | TEMPERATURE: 98.8 F | SYSTOLIC BLOOD PRESSURE: 188 MMHG

## 2022-03-30 DIAGNOSIS — R52 PAIN: ICD-10-CM

## 2022-03-30 DIAGNOSIS — M54.16 LUMBAR RADICULOPATHY: ICD-10-CM

## 2022-03-30 PROCEDURE — 10002801 HB RX 250 W/O HCPCS: Performed by: ANESTHESIOLOGY

## 2022-03-30 PROCEDURE — 64483 NJX AA&/STRD TFRM EPI L/S 1: CPT | Performed by: ANESTHESIOLOGY

## 2022-03-30 PROCEDURE — 10002805 HB CONTRAST AGENT: Performed by: ANESTHESIOLOGY

## 2022-03-30 PROCEDURE — 76000 FLUOROSCOPY <1 HR PHYS/QHP: CPT

## 2022-03-30 PROCEDURE — 13000067 HB PAIN MANAGEMENT GROUP 2

## 2022-03-30 RX ORDER — METHYLPREDNISOLONE ACETATE 80 MG/ML
80 INJECTION, SUSPENSION INTRA-ARTICULAR; INTRALESIONAL; INTRAMUSCULAR; SOFT TISSUE ONCE
Status: COMPLETED | OUTPATIENT
Start: 2022-03-30 | End: 2022-03-30

## 2022-03-30 RX ORDER — BUPIVACAINE HYDROCHLORIDE 2.5 MG/ML
2 INJECTION, SOLUTION EPIDURAL; INFILTRATION; INTRACAUDAL ONCE
Status: COMPLETED | OUTPATIENT
Start: 2022-03-30 | End: 2022-03-30

## 2022-03-30 RX ADMIN — METHYLPREDNISOLONE ACETATE 80 MG: 80 INJECTION, SUSPENSION INTRA-ARTICULAR; INTRALESIONAL; INTRAMUSCULAR; SOFT TISSUE at 18:39

## 2022-03-30 RX ADMIN — BUPIVACAINE HYDROCHLORIDE 5 MG: 2.5 INJECTION, SOLUTION EPIDURAL; INFILTRATION; INTRACAUDAL; PERINEURAL at 18:39

## 2022-03-30 RX ADMIN — IOHEXOL 0.5 ML: 300 INJECTION, SOLUTION INTRAVENOUS at 18:39

## 2022-03-30 ASSESSMENT — PAIN SCALES - GENERAL
PAINLEVEL_OUTOF10: 8
PAINLEVEL_OUTOF10: 8

## 2022-05-05 RX ORDER — NABUMETONE 750 MG/1
TABLET, FILM COATED ORAL
Qty: 60 TABLET | Refills: 1 | Status: ON HOLD | OUTPATIENT
Start: 2022-05-05 | End: 2023-05-22 | Stop reason: HOSPADM

## 2023-01-01 ENCOUNTER — EXTERNAL RECORD (OUTPATIENT)
Dept: OTHER | Age: 63
End: 2023-01-01

## 2023-02-07 ENCOUNTER — EXTERNAL RECORD (OUTPATIENT)
Dept: HEALTH INFORMATION MANAGEMENT | Facility: OTHER | Age: 63
End: 2023-02-07

## 2023-03-03 ENCOUNTER — APPOINTMENT (OUTPATIENT)
Dept: PAIN MANAGEMENT | Age: 63
End: 2023-03-03

## 2023-03-07 ENCOUNTER — EXTERNAL RECORD (OUTPATIENT)
Dept: HEALTH INFORMATION MANAGEMENT | Facility: OTHER | Age: 63
End: 2023-03-07

## 2023-03-08 ENCOUNTER — TELEPHONE (OUTPATIENT)
Dept: PAIN MANAGEMENT | Age: 63
End: 2023-03-08

## 2023-03-15 ENCOUNTER — PREP FOR CASE (OUTPATIENT)
Dept: PAIN MANAGEMENT | Age: 63
End: 2023-03-15

## 2023-03-15 ENCOUNTER — OFFICE VISIT (OUTPATIENT)
Dept: PAIN MANAGEMENT | Age: 63
End: 2023-03-15

## 2023-03-15 VITALS
HEIGHT: 69 IN | DIASTOLIC BLOOD PRESSURE: 106 MMHG | BODY MASS INDEX: 39.99 KG/M2 | WEIGHT: 270 LBS | SYSTOLIC BLOOD PRESSURE: 160 MMHG

## 2023-03-15 DIAGNOSIS — M25.551 RIGHT HIP PAIN: ICD-10-CM

## 2023-03-15 DIAGNOSIS — M54.16 LUMBAR RADICULOPATHY: Primary | ICD-10-CM

## 2023-03-15 PROBLEM — M25.561 ACUTE PAIN OF RIGHT KNEE: Status: RESOLVED | Noted: 2019-07-17 | Resolved: 2023-03-15

## 2023-03-15 PROBLEM — G89.4 CHRONIC PAIN SYNDROME: Status: RESOLVED | Noted: 2020-07-01 | Resolved: 2023-03-15

## 2023-03-15 PROCEDURE — 99214 OFFICE O/P EST MOD 30 MIN: CPT | Performed by: PHYSICIAN ASSISTANT

## 2023-03-15 RX ORDER — BUPROPION HYDROCHLORIDE 150 MG/1
TABLET ORAL
COMMUNITY

## 2023-03-15 ASSESSMENT — PATIENT HEALTH QUESTIONNAIRE - PHQ9
1. LITTLE INTEREST OR PLEASURE IN DOING THINGS: NOT AT ALL
2. FEELING DOWN, DEPRESSED OR HOPELESS: NOT AT ALL
CLINICAL INTERPRETATION OF PHQ2 SCORE: NO FURTHER SCREENING NEEDED
SUM OF ALL RESPONSES TO PHQ9 QUESTIONS 1 AND 2: 0
SUM OF ALL RESPONSES TO PHQ9 QUESTIONS 1 AND 2: 0

## 2023-03-15 ASSESSMENT — PAIN SCALES - GENERAL: PAINLEVEL: 3

## 2023-04-03 ENCOUNTER — TELEPHONE (OUTPATIENT)
Dept: PAIN MANAGEMENT | Age: 63
End: 2023-04-03

## 2023-04-03 DIAGNOSIS — M16.11 PRIMARY OSTEOARTHRITIS OF RIGHT HIP: ICD-10-CM

## 2023-04-03 DIAGNOSIS — M25.551 RIGHT HIP PAIN: Primary | ICD-10-CM

## 2023-04-03 DIAGNOSIS — M54.16 LUMBAR RADICULOPATHY: ICD-10-CM

## 2023-04-04 ENCOUNTER — HOSPITAL ENCOUNTER (OUTPATIENT)
Dept: GENERAL RADIOLOGY | Age: 63
Discharge: HOME | End: 2023-04-04

## 2023-04-04 DIAGNOSIS — M25.551 RIGHT HIP PAIN: ICD-10-CM

## 2023-04-04 PROCEDURE — 73502 X-RAY EXAM HIP UNI 2-3 VIEWS: CPT

## 2023-04-04 RX ORDER — HYDROCODONE BITARTRATE AND ACETAMINOPHEN 10; 325 MG/1; MG/1
TABLET ORAL
Qty: 46 TABLET | Refills: 0 | Status: ON HOLD | OUTPATIENT
Start: 2023-04-04 | End: 2023-05-22 | Stop reason: HOSPADM

## 2023-04-14 ENCOUNTER — OFFICE VISIT (OUTPATIENT)
Dept: ORTHOPEDICS | Age: 63
End: 2023-04-14

## 2023-04-14 ENCOUNTER — TELEPHONE (OUTPATIENT)
Dept: ORTHOPEDICS | Age: 63
End: 2023-04-14

## 2023-04-14 VITALS — BODY MASS INDEX: 40.82 KG/M2 | HEIGHT: 69 IN | WEIGHT: 275.57 LBS

## 2023-04-14 DIAGNOSIS — M16.11 PRIMARY OSTEOARTHRITIS OF RIGHT HIP: Primary | ICD-10-CM

## 2023-04-14 PROCEDURE — 99204 OFFICE O/P NEW MOD 45 MIN: CPT | Performed by: ORTHOPAEDIC SURGERY

## 2023-04-14 RX ORDER — BUPROPION HYDROCHLORIDE 300 MG/1
300 TABLET ORAL EVERY MORNING
COMMUNITY
Start: 2023-03-21

## 2023-04-18 ENCOUNTER — TELEPHONE (OUTPATIENT)
Dept: ORTHOPEDICS | Age: 63
End: 2023-04-18

## 2023-04-18 ENCOUNTER — PREP FOR CASE (OUTPATIENT)
Dept: ORTHOPEDICS | Age: 63
End: 2023-04-18

## 2023-04-18 DIAGNOSIS — M16.11 PRIMARY OSTEOARTHRITIS OF RIGHT HIP: Primary | ICD-10-CM

## 2023-04-24 ENCOUNTER — TELEPHONE (OUTPATIENT)
Dept: PAIN MANAGEMENT | Age: 63
End: 2023-04-24

## 2023-04-26 ENCOUNTER — HOSPITAL ENCOUNTER (OUTPATIENT)
Dept: PAIN MANAGEMENT | Age: 63
Discharge: HOME | End: 2023-04-26
Attending: ANESTHESIOLOGY

## 2023-04-26 ENCOUNTER — HOSPITAL ENCOUNTER (OUTPATIENT)
Dept: GENERAL RADIOLOGY | Age: 63
Discharge: HOME | End: 2023-04-26
Attending: ANESTHESIOLOGY

## 2023-04-26 VITALS
HEART RATE: 68 BPM | OXYGEN SATURATION: 98 % | SYSTOLIC BLOOD PRESSURE: 180 MMHG | TEMPERATURE: 98.2 F | DIASTOLIC BLOOD PRESSURE: 92 MMHG | RESPIRATION RATE: 15 BRPM

## 2023-04-26 DIAGNOSIS — R52 PAIN: ICD-10-CM

## 2023-04-26 DIAGNOSIS — M54.16 LUMBAR RADICULOPATHY: ICD-10-CM

## 2023-04-26 PROCEDURE — 76000 FLUOROSCOPY <1 HR PHYS/QHP: CPT

## 2023-04-26 PROCEDURE — 64483 NJX AA&/STRD TFRM EPI L/S 1: CPT | Performed by: ANESTHESIOLOGY

## 2023-04-26 PROCEDURE — 10002805 HB CONTRAST AGENT: Performed by: ANESTHESIOLOGY

## 2023-04-26 PROCEDURE — 10002801 HB RX 250 W/O HCPCS: Performed by: ANESTHESIOLOGY

## 2023-04-26 PROCEDURE — 13000067 HB PAIN MANAGEMENT GROUP 2

## 2023-04-26 RX ORDER — BUPIVACAINE HYDROCHLORIDE 2.5 MG/ML
2 INJECTION, SOLUTION EPIDURAL; INFILTRATION; INTRACAUDAL ONCE
Status: COMPLETED | OUTPATIENT
Start: 2023-04-26 | End: 2023-04-26

## 2023-04-26 RX ORDER — METHYLPREDNISOLONE ACETATE 80 MG/ML
80 INJECTION, SUSPENSION INTRA-ARTICULAR; INTRALESIONAL; INTRAMUSCULAR; SOFT TISSUE ONCE
Status: COMPLETED | OUTPATIENT
Start: 2023-04-26 | End: 2023-04-26

## 2023-04-26 RX ADMIN — BUPIVACAINE HYDROCHLORIDE 5 MG: 2.5 INJECTION, SOLUTION EPIDURAL; INFILTRATION; INTRACAUDAL at 15:13

## 2023-04-26 RX ADMIN — IOHEXOL 0.5 ML: 300 INJECTION, SOLUTION INTRAVENOUS at 15:12

## 2023-04-26 RX ADMIN — METHYLPREDNISOLONE ACETATE 80 MG: 80 INJECTION, SUSPENSION INTRA-ARTICULAR; INTRALESIONAL; INTRAMUSCULAR; SOFT TISSUE at 15:13

## 2023-04-26 ASSESSMENT — PAIN SCALES - GENERAL
PAINLEVEL_OUTOF10: 9
PAINLEVEL_OUTOF10: 0

## 2023-04-28 ENCOUNTER — TELEPHONE (OUTPATIENT)
Dept: PAIN MANAGEMENT | Age: 63
End: 2023-04-28

## 2023-05-04 ENCOUNTER — TELEPHONE (OUTPATIENT)
Dept: ORTHOPEDICS | Age: 63
End: 2023-05-04

## 2023-05-16 ENCOUNTER — TELEPHONE (OUTPATIENT)
Dept: PAIN MANAGEMENT | Age: 63
End: 2023-05-16

## 2023-05-16 ENCOUNTER — OFFICE VISIT (OUTPATIENT)
Dept: ORTHOPEDICS | Age: 63
End: 2023-05-16

## 2023-05-16 VITALS — BODY MASS INDEX: 40.82 KG/M2 | HEIGHT: 69 IN | WEIGHT: 275.57 LBS

## 2023-05-16 DIAGNOSIS — M16.11 PRIMARY OSTEOARTHRITIS OF RIGHT HIP: ICD-10-CM

## 2023-05-16 DIAGNOSIS — Z96.641 S/P TOTAL RIGHT HIP ARTHROPLASTY: Primary | ICD-10-CM

## 2023-05-16 PROCEDURE — X1094 NO CHARGE VISIT: HCPCS | Performed by: ORTHOPAEDIC SURGERY

## 2023-05-16 RX ORDER — HYDROCODONE BITARTRATE AND ACETAMINOPHEN 10; 325 MG/1; MG/1
1 TABLET ORAL EVERY 6 HOURS PRN
Qty: 28 TABLET | Refills: 0 | Status: ON HOLD | OUTPATIENT
Start: 2023-05-16 | End: 2023-05-22 | Stop reason: SDUPTHER

## 2023-05-16 RX ORDER — TIZANIDINE 2 MG/1
2 TABLET ORAL
Qty: 10 TABLET | Refills: 0 | Status: ON HOLD | OUTPATIENT
Start: 2023-05-16 | End: 2023-05-22 | Stop reason: SDUPTHER

## 2023-05-16 RX ORDER — CELECOXIB 200 MG/1
200 CAPSULE ORAL 2 TIMES DAILY
Qty: 60 CAPSULE | Refills: 0 | Status: ON HOLD | OUTPATIENT
Start: 2023-05-16 | End: 2023-05-22 | Stop reason: SDUPTHER

## 2023-05-17 ENCOUNTER — APPOINTMENT (OUTPATIENT)
Dept: PAIN MANAGEMENT | Age: 63
End: 2023-05-17

## 2023-05-19 RX ORDER — METOPROLOL SUCCINATE 25 MG/1
25 TABLET, EXTENDED RELEASE ORAL EVERY MORNING
COMMUNITY

## 2023-05-19 ASSESSMENT — ACTIVITIES OF DAILY LIVING (ADL)
ADL_SCORE: 12
SENSORY_SUPPORT_DEVICES: DENTURES;EYEGLASSES
NEEDS_ASSIST: NO
MOBILITY_ASSIST_DEVICES: CANE;STANDARD WALKER
ADL_BEFORE_ADMISSION: INDEPENDENT

## 2023-05-22 ENCOUNTER — ANESTHESIA EVENT (OUTPATIENT)
Dept: SURGERY | Age: 63
End: 2023-05-22

## 2023-05-22 ENCOUNTER — APPOINTMENT (OUTPATIENT)
Dept: GENERAL RADIOLOGY | Age: 63
End: 2023-05-22
Attending: ORTHOPAEDIC SURGERY

## 2023-05-22 ENCOUNTER — HOSPITAL ENCOUNTER (OUTPATIENT)
Age: 63
Setting detail: OBSERVATION
Discharge: HOME HEALTH | End: 2023-05-23
Attending: ORTHOPAEDIC SURGERY | Admitting: FAMILY MEDICINE

## 2023-05-22 ENCOUNTER — ANESTHESIA (OUTPATIENT)
Dept: SURGERY | Age: 63
End: 2023-05-22

## 2023-05-22 DIAGNOSIS — Z96.641 STATUS POST HIP REPLACEMENT, RIGHT: ICD-10-CM

## 2023-05-22 DIAGNOSIS — Z96.641 S/P TOTAL RIGHT HIP ARTHROPLASTY: ICD-10-CM

## 2023-05-22 DIAGNOSIS — M16.11 PRIMARY OSTEOARTHRITIS OF RIGHT HIP: Primary | ICD-10-CM

## 2023-05-22 PROCEDURE — 10004651 HB RX, NO CHARGE ITEM: Performed by: PHYSICIAN ASSISTANT

## 2023-05-22 PROCEDURE — 97530 THERAPEUTIC ACTIVITIES: CPT

## 2023-05-22 PROCEDURE — 10004452 HB PACU ADDL 30 MINUTES: Performed by: ORTHOPAEDIC SURGERY

## 2023-05-22 PROCEDURE — 10002800 HB RX 250 W HCPCS: Performed by: ORTHOPAEDIC SURGERY

## 2023-05-22 PROCEDURE — 10006023 HB SUPPLY 272: Performed by: ORTHOPAEDIC SURGERY

## 2023-05-22 PROCEDURE — 10002803 HB RX 637: Performed by: ORTHOPAEDIC SURGERY

## 2023-05-22 PROCEDURE — 27130 TOTAL HIP ARTHROPLASTY: CPT | Performed by: ORTHOPAEDIC SURGERY

## 2023-05-22 PROCEDURE — 10002807 HB RX 258: Performed by: PHYSICIAN ASSISTANT

## 2023-05-22 PROCEDURE — 10002800 HB RX 250 W HCPCS: Performed by: NURSE ANESTHETIST, CERTIFIED REGISTERED

## 2023-05-22 PROCEDURE — 0054T BONE SRGRY CMPTR FLUOR IMAGE: CPT | Performed by: ORTHOPAEDIC SURGERY

## 2023-05-22 PROCEDURE — 10006027 HB SUPPLY 278: Performed by: ORTHOPAEDIC SURGERY

## 2023-05-22 PROCEDURE — 10004451 HB PACU RECOVERY 1ST 30 MINUTES: Performed by: ORTHOPAEDIC SURGERY

## 2023-05-22 PROCEDURE — 76000 FLUOROSCOPY <1 HR PHYS/QHP: CPT

## 2023-05-22 PROCEDURE — 10002801 HB RX 250 W/O HCPCS: Performed by: PHYSICIAN ASSISTANT

## 2023-05-22 PROCEDURE — 97161 PT EVAL LOW COMPLEX 20 MIN: CPT

## 2023-05-22 PROCEDURE — 10002800 HB RX 250 W HCPCS: Performed by: PHYSICIAN ASSISTANT

## 2023-05-22 PROCEDURE — 10002803 HB RX 637: Performed by: FAMILY MEDICINE

## 2023-05-22 PROCEDURE — 13000003 HB ANESTHESIA  GENERAL EA ADD MINUTE: Performed by: ORTHOPAEDIC SURGERY

## 2023-05-22 PROCEDURE — 96375 TX/PRO/DX INJ NEW DRUG ADDON: CPT

## 2023-05-22 PROCEDURE — 10002807 HB RX 258: Performed by: NURSE ANESTHETIST, CERTIFIED REGISTERED

## 2023-05-22 PROCEDURE — G0378 HOSPITAL OBSERVATION PER HR: HCPCS

## 2023-05-22 PROCEDURE — C1776 JOINT DEVICE (IMPLANTABLE): HCPCS | Performed by: ORTHOPAEDIC SURGERY

## 2023-05-22 PROCEDURE — 13000118 HB ORTHO MAJOR COMPLEX CASE S/U + 1ST 15 MIN: Performed by: ORTHOPAEDIC SURGERY

## 2023-05-22 PROCEDURE — 13000119 HB ORTHO MAJOR COMPLEX CASE EA ADD MINUTE: Performed by: ORTHOPAEDIC SURGERY

## 2023-05-22 PROCEDURE — 10002800 HB RX 250 W HCPCS: Performed by: ANESTHESIOLOGY

## 2023-05-22 PROCEDURE — 13000002 HB ANESTHESIA  GENERAL  S/U + 1ST 15 MIN: Performed by: ORTHOPAEDIC SURGERY

## 2023-05-22 PROCEDURE — 10004651 HB RX, NO CHARGE ITEM: Performed by: ORTHOPAEDIC SURGERY

## 2023-05-22 PROCEDURE — 96374 THER/PROPH/DIAG INJ IV PUSH: CPT

## 2023-05-22 PROCEDURE — 10002807 HB RX 258: Performed by: ORTHOPAEDIC SURGERY

## 2023-05-22 PROCEDURE — 10002801 HB RX 250 W/O HCPCS: Performed by: ANESTHESIOLOGY

## 2023-05-22 PROCEDURE — 10002801 HB RX 250 W/O HCPCS: Performed by: NURSE ANESTHETIST, CERTIFIED REGISTERED

## 2023-05-22 PROCEDURE — 10002803 HB RX 637: Performed by: PHYSICIAN ASSISTANT

## 2023-05-22 PROCEDURE — 10002803 HB RX 637: Performed by: ANESTHESIOLOGY

## 2023-05-22 DEVICE — PINNACLE GRIPTION ACETABULAR SHELL SECTOR 54MM OD
Type: IMPLANTABLE DEVICE | Site: HIP | Status: FUNCTIONAL
Brand: PINNACLE GRIPTION

## 2023-05-22 DEVICE — PINNACLE HIP SOLUTIONS ALTRX POLYETHYLENE ACETABULAR LINER NEUTRAL 36MM ID 54MM OD
Type: IMPLANTABLE DEVICE | Site: HIP | Status: FUNCTIONAL
Brand: PINNACLE ALTRX

## 2023-05-22 DEVICE — BIOLOX DELTA CERAMIC FEMORAL HEAD +5.0 36MM DIA 12/14 TAPER
Type: IMPLANTABLE DEVICE | Site: HIP | Status: FUNCTIONAL
Brand: BIOLOX DELTA

## 2023-05-22 DEVICE — CORAIL HIP SYSTEM CEMENTLESS FEMORAL STEM 12/14 AMT 135 DEGREES KA SIZE 12 HA COATED STANDARD COLLAR
Type: IMPLANTABLE DEVICE | Site: HIP | Status: FUNCTIONAL
Brand: CORAIL

## 2023-05-22 RX ORDER — QUETIAPINE FUMARATE 100 MG/1
100 TABLET, FILM COATED ORAL AT BEDTIME
Status: DISCONTINUED | OUTPATIENT
Start: 2023-05-22 | End: 2023-05-23 | Stop reason: HOSPADM

## 2023-05-22 RX ORDER — NALOXONE HCL 0.4 MG/ML
0.2 VIAL (ML) INJECTION EVERY 5 MIN PRN
Status: DISCONTINUED | OUTPATIENT
Start: 2023-05-22 | End: 2023-05-22 | Stop reason: HOSPADM

## 2023-05-22 RX ORDER — SODIUM CHLORIDE, SODIUM LACTATE, POTASSIUM CHLORIDE, CALCIUM CHLORIDE 600; 310; 30; 20 MG/100ML; MG/100ML; MG/100ML; MG/100ML
INJECTION, SOLUTION INTRAVENOUS CONTINUOUS PRN
Status: DISCONTINUED | OUTPATIENT
Start: 2023-05-22 | End: 2023-05-22

## 2023-05-22 RX ORDER — TRANEXAMIC ACID 10 MG/ML
1000 INJECTION, SOLUTION INTRAVENOUS
Status: DISCONTINUED | OUTPATIENT
Start: 2023-05-22 | End: 2023-05-22 | Stop reason: HOSPADM

## 2023-05-22 RX ORDER — TIZANIDINE 4 MG/1
4 TABLET ORAL 3 TIMES DAILY PRN
Status: DISCONTINUED | OUTPATIENT
Start: 2023-05-22 | End: 2023-05-23 | Stop reason: HOSPADM

## 2023-05-22 RX ORDER — EPHEDRINE SULFATE/0.9% NACL/PF 50 MG/10ML
SYRINGE (ML) INTRAVENOUS PRN
Status: DISCONTINUED | OUTPATIENT
Start: 2023-05-22 | End: 2023-05-22

## 2023-05-22 RX ORDER — SODIUM CHLORIDE, SODIUM LACTATE, POTASSIUM CHLORIDE, CALCIUM CHLORIDE 600; 310; 30; 20 MG/100ML; MG/100ML; MG/100ML; MG/100ML
INJECTION, SOLUTION INTRAVENOUS CONTINUOUS
Status: ACTIVE | OUTPATIENT
Start: 2023-05-22 | End: 2023-05-23

## 2023-05-22 RX ORDER — ACETAMINOPHEN 500 MG
1000 TABLET ORAL
Status: COMPLETED | OUTPATIENT
Start: 2023-05-22 | End: 2023-05-22

## 2023-05-22 RX ORDER — NALOXONE HYDROCHLORIDE 4 MG/.1ML
SPRAY NASAL
Qty: 2 EACH | Refills: 1 | Status: SHIPPED | OUTPATIENT
Start: 2023-05-22

## 2023-05-22 RX ORDER — VENLAFAXINE HYDROCHLORIDE 37.5 MG/1
37.5 CAPSULE, EXTENDED RELEASE ORAL DAILY
Status: DISCONTINUED | OUTPATIENT
Start: 2023-05-23 | End: 2023-05-23 | Stop reason: HOSPADM

## 2023-05-22 RX ORDER — DEXAMETHASONE SODIUM PHOSPHATE 4 MG/ML
INJECTION, SOLUTION INTRA-ARTICULAR; INTRALESIONAL; INTRAMUSCULAR; INTRAVENOUS; SOFT TISSUE PRN
Status: DISCONTINUED | OUTPATIENT
Start: 2023-05-22 | End: 2023-05-22

## 2023-05-22 RX ORDER — DIPHENHYDRAMINE HCL 25 MG
25 CAPSULE ORAL EVERY 4 HOURS PRN
Status: DISCONTINUED | OUTPATIENT
Start: 2023-05-22 | End: 2023-05-23 | Stop reason: HOSPADM

## 2023-05-22 RX ORDER — KETOROLAC TROMETHAMINE 30 MG/ML
15 INJECTION, SOLUTION INTRAMUSCULAR; INTRAVENOUS EVERY 8 HOURS SCHEDULED
Status: COMPLETED | OUTPATIENT
Start: 2023-05-22 | End: 2023-05-23

## 2023-05-22 RX ORDER — ASPIRIN 81 MG/1
81 TABLET ORAL EVERY 12 HOURS SCHEDULED
Status: DISCONTINUED | OUTPATIENT
Start: 2023-05-22 | End: 2023-05-23 | Stop reason: HOSPADM

## 2023-05-22 RX ORDER — DEXTROSE MONOHYDRATE 50 MG/ML
INJECTION, SOLUTION INTRAVENOUS CONTINUOUS PRN
Status: DISCONTINUED | OUTPATIENT
Start: 2023-05-22 | End: 2023-05-22

## 2023-05-22 RX ORDER — TRANEXAMIC ACID 10 MG/ML
INJECTION, SOLUTION INTRAVENOUS PRN
Status: DISCONTINUED | OUTPATIENT
Start: 2023-05-22 | End: 2023-05-22

## 2023-05-22 RX ORDER — HYDROCODONE BITARTRATE AND ACETAMINOPHEN 10; 325 MG/1; MG/1
1 TABLET ORAL EVERY 6 HOURS PRN
Status: DISCONTINUED | OUTPATIENT
Start: 2023-05-22 | End: 2023-05-23 | Stop reason: HOSPADM

## 2023-05-22 RX ORDER — ACETAMINOPHEN 500 MG
1000 TABLET ORAL EVERY 8 HOURS SCHEDULED
Status: DISCONTINUED | OUTPATIENT
Start: 2023-05-22 | End: 2023-05-23 | Stop reason: HOSPADM

## 2023-05-22 RX ORDER — CELECOXIB 200 MG/1
200 CAPSULE ORAL
Status: COMPLETED | OUTPATIENT
Start: 2023-05-22 | End: 2023-05-22

## 2023-05-22 RX ORDER — TIZANIDINE 2 MG/1
2 TABLET ORAL
Status: ON HOLD | COMMUNITY
End: 2023-05-23 | Stop reason: SDUPTHER

## 2023-05-22 RX ORDER — PHENYLEPHRINE HYDROCHLORIDE 10 MG/ML
INJECTION, SOLUTION INTRAMUSCULAR; INTRAVENOUS; SUBCUTANEOUS PRN
Status: DISCONTINUED | OUTPATIENT
Start: 2023-05-22 | End: 2023-05-22

## 2023-05-22 RX ORDER — MIDAZOLAM HYDROCHLORIDE 1 MG/ML
2 INJECTION, SOLUTION INTRAMUSCULAR; INTRAVENOUS
Status: COMPLETED | OUTPATIENT
Start: 2023-05-22 | End: 2023-05-22

## 2023-05-22 RX ORDER — VENLAFAXINE HYDROCHLORIDE 75 MG/1
75 CAPSULE, EXTENDED RELEASE ORAL DAILY
Status: DISCONTINUED | OUTPATIENT
Start: 2023-05-23 | End: 2023-05-23 | Stop reason: HOSPADM

## 2023-05-22 RX ORDER — TRAMADOL HYDROCHLORIDE 50 MG/1
25 TABLET ORAL EVERY 4 HOURS PRN
Status: DISCONTINUED | OUTPATIENT
Start: 2023-05-22 | End: 2023-05-23

## 2023-05-22 RX ORDER — ACETAMINOPHEN 500 MG
500 TABLET ORAL EVERY 8 HOURS SCHEDULED
Status: SHIPPED | COMMUNITY
Start: 2023-05-22 | End: 2023-05-22

## 2023-05-22 RX ORDER — HYDRALAZINE HYDROCHLORIDE 20 MG/ML
5 INJECTION INTRAMUSCULAR; INTRAVENOUS EVERY 10 MIN PRN
Status: DISCONTINUED | OUTPATIENT
Start: 2023-05-22 | End: 2023-05-22 | Stop reason: HOSPADM

## 2023-05-22 RX ORDER — VENLAFAXINE HYDROCHLORIDE 75 MG/1
75 CAPSULE, EXTENDED RELEASE ORAL DAILY
COMMUNITY

## 2023-05-22 RX ORDER — LIDOCAINE HYDROCHLORIDE 10 MG/ML
INJECTION, SOLUTION INFILTRATION; PERINEURAL PRN
Status: DISCONTINUED | OUTPATIENT
Start: 2023-05-22 | End: 2023-05-22

## 2023-05-22 RX ORDER — ONDANSETRON 4 MG/1
4 TABLET, ORALLY DISINTEGRATING ORAL EVERY 12 HOURS PRN
Status: DISCONTINUED | OUTPATIENT
Start: 2023-05-22 | End: 2023-05-23 | Stop reason: HOSPADM

## 2023-05-22 RX ORDER — METOPROLOL SUCCINATE 25 MG/1
25 TABLET, EXTENDED RELEASE ORAL EVERY MORNING
Status: DISCONTINUED | OUTPATIENT
Start: 2023-05-23 | End: 2023-05-23 | Stop reason: HOSPADM

## 2023-05-22 RX ORDER — HYDROCODONE BITARTRATE AND ACETAMINOPHEN 10; 325 MG/1; MG/1
1 TABLET ORAL EVERY 6 HOURS PRN
Qty: 28 TABLET | Refills: 0 | Status: SHIPPED | COMMUNITY
Start: 2023-05-22

## 2023-05-22 RX ORDER — VENLAFAXINE HYDROCHLORIDE 37.5 MG/1
37.5 CAPSULE, EXTENDED RELEASE ORAL DAILY
COMMUNITY

## 2023-05-22 RX ORDER — CELECOXIB 100 MG/1
100 CAPSULE ORAL EVERY 12 HOURS SCHEDULED
Status: DISCONTINUED | OUTPATIENT
Start: 2023-05-23 | End: 2023-05-23 | Stop reason: HOSPADM

## 2023-05-22 RX ORDER — TIZANIDINE 2 MG/1
2 TABLET ORAL
Status: DISCONTINUED | OUTPATIENT
Start: 2023-05-22 | End: 2023-05-22 | Stop reason: DRUGHIGH

## 2023-05-22 RX ORDER — DEXAMETHASONE SODIUM PHOSPHATE 10 MG/ML
10 INJECTION, SOLUTION INTRAMUSCULAR; INTRAVENOUS ONCE
Status: COMPLETED | OUTPATIENT
Start: 2023-05-23 | End: 2023-05-23

## 2023-05-22 RX ORDER — PROPOFOL 10 MG/ML
INJECTION, EMULSION INTRAVENOUS PRN
Status: DISCONTINUED | OUTPATIENT
Start: 2023-05-22 | End: 2023-05-22

## 2023-05-22 RX ORDER — TIZANIDINE 2 MG/1
2 TABLET ORAL
Qty: 10 TABLET | Refills: 0 | Status: SHIPPED | COMMUNITY
Start: 2023-05-22 | End: 2023-05-22

## 2023-05-22 RX ORDER — BUPROPION HYDROCHLORIDE 150 MG/1
300 TABLET ORAL EVERY MORNING
Status: DISCONTINUED | OUTPATIENT
Start: 2023-05-23 | End: 2023-05-23 | Stop reason: HOSPADM

## 2023-05-22 RX ORDER — ONDANSETRON 2 MG/ML
4 INJECTION INTRAMUSCULAR; INTRAVENOUS EVERY 12 HOURS PRN
Status: DISCONTINUED | OUTPATIENT
Start: 2023-05-22 | End: 2023-05-23 | Stop reason: HOSPADM

## 2023-05-22 RX ORDER — ASPIRIN 81 MG/1
81 TABLET ORAL EVERY 12 HOURS SCHEDULED
Status: SHIPPED | COMMUNITY
Start: 2023-05-22 | End: 2023-05-22

## 2023-05-22 RX ORDER — HYDROCODONE BITARTRATE AND ACETAMINOPHEN 10; 325 MG/1; MG/1
1 TABLET ORAL ONCE
Status: COMPLETED | OUTPATIENT
Start: 2023-05-22 | End: 2023-05-22

## 2023-05-22 RX ORDER — ONDANSETRON 2 MG/ML
INJECTION INTRAMUSCULAR; INTRAVENOUS PRN
Status: DISCONTINUED | OUTPATIENT
Start: 2023-05-22 | End: 2023-05-22

## 2023-05-22 RX ORDER — BUPROPION HYDROCHLORIDE 150 MG/1
300 TABLET ORAL DAILY
Status: DISCONTINUED | OUTPATIENT
Start: 2023-05-23 | End: 2023-05-22 | Stop reason: SDUPTHER

## 2023-05-22 RX ORDER — CELECOXIB 200 MG/1
200 CAPSULE ORAL 2 TIMES DAILY
Qty: 60 CAPSULE | Refills: 0 | Status: SHIPPED | COMMUNITY
Start: 2023-05-22 | End: 2023-06-07

## 2023-05-22 RX ORDER — SODIUM CHLORIDE 9 MG/ML
INJECTION, SOLUTION INTRAVENOUS CONTINUOUS
Status: DISCONTINUED | OUTPATIENT
Start: 2023-05-22 | End: 2023-05-22 | Stop reason: HOSPADM

## 2023-05-22 RX ORDER — SODIUM CHLORIDE, SODIUM LACTATE, POTASSIUM CHLORIDE, CALCIUM CHLORIDE 600; 310; 30; 20 MG/100ML; MG/100ML; MG/100ML; MG/100ML
INJECTION, SOLUTION INTRAVENOUS CONTINUOUS
Status: DISCONTINUED | OUTPATIENT
Start: 2023-05-22 | End: 2023-05-22 | Stop reason: HOSPADM

## 2023-05-22 RX ORDER — ALBUTEROL SULFATE 90 UG/1
2 AEROSOL, METERED RESPIRATORY (INHALATION) EVERY 4 HOURS PRN
Status: DISCONTINUED | OUTPATIENT
Start: 2023-05-22 | End: 2023-05-23 | Stop reason: HOSPADM

## 2023-05-22 RX ADMIN — TRANEXAMIC ACID 1000 MG: 10 INJECTION, SOLUTION INTRAVENOUS at 10:00

## 2023-05-22 RX ADMIN — EPHEDRINE SULFATE 10 MG: 5 INJECTION INTRAVENOUS at 10:57

## 2023-05-22 RX ADMIN — HYDROMORPHONE HYDROCHLORIDE 0.5 MG: 0.5 INJECTION, SOLUTION INTRAMUSCULAR; INTRAVENOUS; SUBCUTANEOUS at 13:09

## 2023-05-22 RX ADMIN — EPHEDRINE SULFATE 10 MG: 5 INJECTION INTRAVENOUS at 11:02

## 2023-05-22 RX ADMIN — CEFAZOLIN 3000 MG: 10 INJECTION, POWDER, FOR SOLUTION INTRAVENOUS at 09:47

## 2023-05-22 RX ADMIN — DEXAMETHASONE SODIUM PHOSPHATE 4 MG: 4 INJECTION, SOLUTION INTRAMUSCULAR; INTRAVENOUS at 09:52

## 2023-05-22 RX ADMIN — SODIUM CHLORIDE, POTASSIUM CHLORIDE, SODIUM LACTATE AND CALCIUM CHLORIDE: 600; 310; 30; 20 INJECTION, SOLUTION INTRAVENOUS at 12:52

## 2023-05-22 RX ADMIN — ACETAMINOPHEN 1000 MG: 500 TABLET ORAL at 08:05

## 2023-05-22 RX ADMIN — TRAMADOL HYDROCHLORIDE 25 MG: 50 TABLET, FILM COATED ORAL at 18:42

## 2023-05-22 RX ADMIN — ACETAMINOPHEN 1000 MG: 500 TABLET ORAL at 14:30

## 2023-05-22 RX ADMIN — TRANEXAMIC ACID 1000 MG: 10 INJECTION, SOLUTION INTRAVENOUS at 11:09

## 2023-05-22 RX ADMIN — Medication 150 MG: at 10:20

## 2023-05-22 RX ADMIN — CEFAZOLIN SODIUM 2000 MG: 300 INJECTION, POWDER, LYOPHILIZED, FOR SOLUTION INTRAVENOUS at 14:30

## 2023-05-22 RX ADMIN — FENTANYL CITRATE 25 MCG: 50 INJECTION, SOLUTION INTRAMUSCULAR; INTRAVENOUS at 12:42

## 2023-05-22 RX ADMIN — FENTANYL CITRATE 25 MCG: 50 INJECTION, SOLUTION INTRAMUSCULAR; INTRAVENOUS at 13:03

## 2023-05-22 RX ADMIN — CELECOXIB 200 MG: 200 CAPSULE ORAL at 08:05

## 2023-05-22 RX ADMIN — PROPOFOL 100 MG: 10 INJECTION, EMULSION INTRAVENOUS at 10:02

## 2023-05-22 RX ADMIN — LIDOCAINE HYDROCHLORIDE 5 ML: 10 INJECTION, SOLUTION INFILTRATION; PERINEURAL at 10:02

## 2023-05-22 RX ADMIN — ACETAMINOPHEN 1000 MG: 500 TABLET ORAL at 21:58

## 2023-05-22 RX ADMIN — HYDROCODONE BITARTRATE AND ACETAMINOPHEN 1 TABLET: 10; 325 TABLET ORAL at 13:30

## 2023-05-22 RX ADMIN — ASPIRIN 81 MG: 81 TABLET, COATED ORAL at 21:58

## 2023-05-22 RX ADMIN — KETOROLAC TROMETHAMINE 15 MG: 30 INJECTION, SOLUTION INTRAMUSCULAR; INTRAVENOUS at 14:30

## 2023-05-22 RX ADMIN — CEFAZOLIN SODIUM 2000 MG: 300 INJECTION, POWDER, LYOPHILIZED, FOR SOLUTION INTRAVENOUS at 21:57

## 2023-05-22 RX ADMIN — SODIUM CHLORIDE, POTASSIUM CHLORIDE, SODIUM LACTATE AND CALCIUM CHLORIDE: 600; 310; 30; 20 INJECTION, SOLUTION INTRAVENOUS at 11:50

## 2023-05-22 RX ADMIN — MEPIVACAINE HYDROCHLORIDE 2.5 ML: 20 INJECTION, SOLUTION EPIDURAL; INFILTRATION at 09:56

## 2023-05-22 RX ADMIN — PROPOFOL 150 MG: 10 INJECTION, EMULSION INTRAVENOUS at 10:20

## 2023-05-22 RX ADMIN — EPHEDRINE SULFATE 10 MG: 5 INJECTION INTRAVENOUS at 10:42

## 2023-05-22 RX ADMIN — HYDROMORPHONE HYDROCHLORIDE 0.5 MG: 0.5 INJECTION, SOLUTION INTRAMUSCULAR; INTRAVENOUS; SUBCUTANEOUS at 12:34

## 2023-05-22 RX ADMIN — PROPOFOL 100 MCG/KG/MIN: 10 INJECTION, EMULSION INTRAVENOUS at 10:02

## 2023-05-22 RX ADMIN — PROPOFOL 50 MG: 10 INJECTION, EMULSION INTRAVENOUS at 10:14

## 2023-05-22 RX ADMIN — PHENYLEPHRINE HYDROCHLORIDE 100 MCG: 10 INJECTION, SOLUTION INTRAVENOUS at 10:32

## 2023-05-22 RX ADMIN — ONDANSETRON 4 MG: 2 INJECTION INTRAMUSCULAR; INTRAVENOUS at 09:52

## 2023-05-22 RX ADMIN — HYDRALAZINE HYDROCHLORIDE 5 MG: 20 INJECTION INTRAMUSCULAR; INTRAVENOUS at 12:59

## 2023-05-22 RX ADMIN — QUETIAPINE FUMARATE 100 MG: 100 TABLET ORAL at 21:58

## 2023-05-22 RX ADMIN — KETOROLAC TROMETHAMINE 15 MG: 30 INJECTION, SOLUTION INTRAMUSCULAR; INTRAVENOUS at 21:56

## 2023-05-22 RX ADMIN — HYDRALAZINE HYDROCHLORIDE 5 MG: 20 INJECTION INTRAMUSCULAR; INTRAVENOUS at 13:32

## 2023-05-22 RX ADMIN — EPHEDRINE SULFATE 10 MG: 5 INJECTION INTRAVENOUS at 10:46

## 2023-05-22 RX ADMIN — TRAMADOL HYDROCHLORIDE 25 MG: 50 TABLET, FILM COATED ORAL at 14:30

## 2023-05-22 RX ADMIN — VANCOMYCIN HYDROCHLORIDE 2000 MG: 10 INJECTION, POWDER, LYOPHILIZED, FOR SOLUTION INTRAVENOUS at 08:55

## 2023-05-22 RX ADMIN — HYDROMORPHONE HYDROCHLORIDE 0.5 MG: 0.5 INJECTION, SOLUTION INTRAMUSCULAR; INTRAVENOUS; SUBCUTANEOUS at 12:26

## 2023-05-22 RX ADMIN — HYDRALAZINE HYDROCHLORIDE 5 MG: 20 INJECTION INTRAMUSCULAR; INTRAVENOUS at 13:11

## 2023-05-22 RX ADMIN — SODIUM CHLORIDE, POTASSIUM CHLORIDE, SODIUM LACTATE AND CALCIUM CHLORIDE: 600; 310; 30; 20 INJECTION, SOLUTION INTRAVENOUS at 09:45

## 2023-05-22 RX ADMIN — ASPIRIN 81 MG: 81 TABLET, COATED ORAL at 14:30

## 2023-05-22 RX ADMIN — MIDAZOLAM HYDROCHLORIDE 2 MG: 1 INJECTION, SOLUTION INTRAMUSCULAR; INTRAVENOUS at 09:42

## 2023-05-22 RX ADMIN — PROPOFOL 50 MG: 10 INJECTION, EMULSION INTRAVENOUS at 10:06

## 2023-05-22 RX ADMIN — EPHEDRINE SULFATE 10 MG: 5 INJECTION INTRAVENOUS at 10:51

## 2023-05-22 SDOH — ECONOMIC STABILITY: TRANSPORTATION INSECURITY
IN THE PAST 12 MONTHS, HAS LACK OF TRANSPORTATION KEPT YOU FROM MEETINGS, WORK, OR FROM GETTING THINGS NEEDED FOR DAILY LIVING?: NO

## 2023-05-22 SDOH — ECONOMIC STABILITY: GENERAL
IN THE PAST YEAR, HAVE YOU OR ANY FAMILY MEMBERS YOU LIVE WITH BEEN UNABLE TO GET ANY OF THE FOLLOWING WHEN IT WAS REALLY NEEDED? CHECK ALL THAT APPLY.: CLOTHING

## 2023-05-22 SDOH — HEALTH STABILITY: GENERAL
BECAUSE OF A PHYSICAL, MENTAL, OR EMOTIONAL CONDITION, DO YOU HAVE SERIOUS DIFFICULTY CONCENTRATING, REMEMBERING OR MAKING DECISIONS?: NO

## 2023-05-22 SDOH — SOCIAL STABILITY: SOCIAL INSECURITY: RISK FACTORS: SLEEP APNEA

## 2023-05-22 SDOH — SOCIAL STABILITY: SOCIAL NETWORK: SUPPORT SYSTEMS: FAMILY MEMBERS;FRIENDS;SPOUSE

## 2023-05-22 SDOH — HEALTH STABILITY: PHYSICAL HEALTH: DO YOU HAVE SERIOUS DIFFICULTY WALKING OR CLIMBING STAIRS?: NO

## 2023-05-22 SDOH — HEALTH STABILITY: PHYSICAL HEALTH: DO YOU HAVE DIFFICULTY DRESSING OR BATHING?: NO

## 2023-05-22 SDOH — SOCIAL STABILITY: SOCIAL INSECURITY: RISK FACTORS: HEART DISEASE

## 2023-05-22 SDOH — ECONOMIC STABILITY: TRANSPORTATION INSECURITY
IN THE PAST 12 MONTHS, HAS THE LACK OF TRANSPORTATION KEPT YOU FROM MEDICAL APPOINTMENTS OR FROM GETTING MEDICATIONS?: NO

## 2023-05-22 SDOH — ECONOMIC STABILITY: HOUSING INSECURITY: WHAT IS YOUR LIVING SITUATION TODAY?: HOUSE

## 2023-05-22 SDOH — HEALTH STABILITY: GENERAL: BECAUSE OF A PHYSICAL, MENTAL, OR EMOTIONAL CONDITION, DO YOU HAVE DIFFICULTY DOING ERRANDS ALONE?: NO

## 2023-05-22 SDOH — ECONOMIC STABILITY: HOUSING INSECURITY: ARE YOU WORRIED ABOUT LOSING YOUR HOUSING?: NO

## 2023-05-22 SDOH — ECONOMIC STABILITY: HOUSING INSECURITY: WHAT IS YOUR LIVING SITUATION TODAY?: SPOUSE

## 2023-05-22 SDOH — SOCIAL STABILITY: SOCIAL INSECURITY: RISK FACTORS: PULMONARY DISEASE

## 2023-05-22 SDOH — SOCIAL STABILITY: SOCIAL NETWORK
HOW OFTEN DO YOU SEE OR TALK TO PEOPLE THAT YOU CARE ABOUT AND FEEL CLOSE TO? (FOR EXAMPLE: TALKING TO FRIENDS ON THE PHONE, VISITING FRIENDS OR FAMILY, GOING TO CHURCH OR CLUB MEETINGS): 5 OR MORE TIMES A WEEK

## 2023-05-22 SDOH — ECONOMIC STABILITY: FOOD INSECURITY: HOW OFTEN IN THE PAST 12 MONTHS WERE YOU WORRIED OR STRESSED ABOUT HAVING ENOUGH MONEY TO BUY NUTRITIOUS MEALS?: NEVER

## 2023-05-22 SDOH — SOCIAL STABILITY: SOCIAL INSECURITY: RISK FACTORS: BMI> 30 (OBESITY)

## 2023-05-22 ASSESSMENT — PATIENT HEALTH QUESTIONNAIRE - PHQ9
SUM OF ALL RESPONSES TO PHQ9 QUESTIONS 1 AND 2: 0
1. LITTLE INTEREST OR PLEASURE IN DOING THINGS: NOT AT ALL
2. FEELING DOWN, DEPRESSED OR HOPELESS: NOT AT ALL
CLINICAL INTERPRETATION OF PHQ2 SCORE: NO FURTHER SCREENING NEEDED
IS PATIENT ABLE TO COMPLETE PHQ2 OR PHQ9: YES
SUM OF ALL RESPONSES TO PHQ9 QUESTIONS 1 AND 2: 0

## 2023-05-22 ASSESSMENT — ACTIVITIES OF DAILY LIVING (ADL)
PRIOR_ADL_BATHING: INDEPENDENT
ADL_SHORT_OF_BREATH: NO
RECENT_DECLINE_ADL: NO
ADL_SCORE: 12
PRIOR_ADL: INDEPENDENT
ADL_BEFORE_ADMISSION: INDEPENDENT
EATING: INDEPENDENT
PRIOR_ADL_TOILETING: INDEPENDENT

## 2023-05-22 ASSESSMENT — PAIN SCALES - GENERAL
PAINLEVEL_OUTOF10: 2
PAINLEVEL_OUTOF10: 3
PAINLEVEL_OUTOF10: 6
PAINLEVEL_OUTOF10: 8
PAINLEVEL_OUTOF10: 8
PAINLEVEL_OUTOF10: 0
PAINLEVEL_OUTOF10: 7
PAINLEVEL_OUTOF10: 9
PAINLEVEL_OUTOF10: 8
PAINLEVEL_OUTOF10: 6
PAINLEVEL_OUTOF10: 5
PAINLEVEL_OUTOF10: 8
PAINLEVEL_OUTOF10: 5
PAINLEVEL_OUTOF10: 8

## 2023-05-22 ASSESSMENT — COLUMBIA-SUICIDE SEVERITY RATING SCALE - C-SSRS
1. WITHIN THE PAST MONTH, HAVE YOU WISHED YOU WERE DEAD OR WISHED YOU COULD GO TO SLEEP AND NOT WAKE UP?: NO
IS THE PATIENT ABLE TO COMPLETE C-SSRS: YES
6. HAVE YOU EVER DONE ANYTHING, STARTED TO DO ANYTHING, OR PREPARED TO DO ANYTHING TO END YOUR LIFE?: NO
2. HAVE YOU ACTUALLY HAD ANY THOUGHTS OF KILLING YOURSELF?: NO

## 2023-05-22 ASSESSMENT — LIFESTYLE VARIABLES
SMOKING_STATUS: FORMER
ALCOHOL_USE_STATUS: NO OR LOW RISK WITH VALIDATED TOOL
HOW MANY STANDARD DRINKS CONTAINING ALCOHOL DO YOU HAVE ON A TYPICAL DAY: 0,1 OR 2
AUDIT-C TOTAL SCORE: 0
HOW OFTEN DO YOU HAVE A DRINK CONTAINING ALCOHOL: NEVER
HOW OFTEN DO YOU HAVE 6 OR MORE DRINKS ON ONE OCCASION: NEVER

## 2023-05-22 ASSESSMENT — COGNITIVE AND FUNCTIONAL STATUS - GENERAL
BASIC_MOBILITY_CONVERTED_SCORE: 41.05
BASIC_MOBILITY_RAW_SCORE: 18

## 2023-05-22 ASSESSMENT — PAIN SCALES - WONG BAKER
WONGBAKER_NUMERICALRESPONSE: 0
WONGBAKER_NUMERICALRESPONSE: 0

## 2023-05-22 ASSESSMENT — ENCOUNTER SYMPTOMS: PAIN SEVERITY NOW: 5

## 2023-05-23 VITALS
WEIGHT: 260 LBS | TEMPERATURE: 98.1 F | HEART RATE: 66 BPM | RESPIRATION RATE: 16 BRPM | SYSTOLIC BLOOD PRESSURE: 137 MMHG | OXYGEN SATURATION: 97 % | BODY MASS INDEX: 40.81 KG/M2 | HEIGHT: 67 IN | DIASTOLIC BLOOD PRESSURE: 59 MMHG

## 2023-05-23 LAB
ANION GAP SERPL CALC-SCNC: 7 MMOL/L (ref 7–19)
BASOPHILS # BLD: 0 K/MCL (ref 0–0.3)
BASOPHILS NFR BLD: 0 %
BUN SERPL-MCNC: 25 MG/DL (ref 6–20)
BUN/CREAT SERPL: 37 (ref 7–25)
CALCIUM SERPL-MCNC: 8 MG/DL (ref 8.4–10.2)
CHLORIDE SERPL-SCNC: 111 MMOL/L (ref 97–110)
CO2 SERPL-SCNC: 25 MMOL/L (ref 21–32)
CREAT SERPL-MCNC: 0.68 MG/DL (ref 0.51–0.95)
DEPRECATED RDW RBC: 49.5 FL (ref 39–50)
EOSINOPHIL # BLD: 0 K/MCL (ref 0–0.5)
EOSINOPHIL NFR BLD: 0 %
ERYTHROCYTE [DISTWIDTH] IN BLOOD: 14 % (ref 11–15)
FASTING DURATION TIME PATIENT: ABNORMAL H
GFR SERPLBLD BASED ON 1.73 SQ M-ARVRAT: >90 ML/MIN
GLUCOSE SERPL-MCNC: 118 MG/DL (ref 70–99)
HCT VFR BLD CALC: 34 % (ref 36–46.5)
HGB BLD-MCNC: 10.9 G/DL (ref 12–15.5)
IMM GRANULOCYTES # BLD AUTO: 0 K/MCL (ref 0–0.2)
IMM GRANULOCYTES # BLD: 0 %
LYMPHOCYTES # BLD: 0.9 K/MCL (ref 1–4)
LYMPHOCYTES NFR BLD: 11 %
MCH RBC QN AUTO: 30.6 PG (ref 26–34)
MCHC RBC AUTO-ENTMCNC: 32.1 G/DL (ref 32–36.5)
MCV RBC AUTO: 95.5 FL (ref 78–100)
MONOCYTES # BLD: 0.7 K/MCL (ref 0.3–0.9)
MONOCYTES NFR BLD: 8 %
NEUTROPHILS # BLD: 6.7 K/MCL (ref 1.8–7.7)
NEUTROPHILS NFR BLD: 81 %
NRBC BLD MANUAL-RTO: 0 /100 WBC
PLATELET # BLD AUTO: 177 K/MCL (ref 140–450)
POTASSIUM SERPL-SCNC: 4.3 MMOL/L (ref 3.4–5.1)
RBC # BLD: 3.56 MIL/MCL (ref 4–5.2)
SODIUM SERPL-SCNC: 139 MMOL/L (ref 135–145)
WBC # BLD: 8.4 K/MCL (ref 4.2–11)

## 2023-05-23 PROCEDURE — 85025 COMPLETE CBC W/AUTO DIFF WBC: CPT | Performed by: ORTHOPAEDIC SURGERY

## 2023-05-23 PROCEDURE — 10002800 HB RX 250 W HCPCS: Performed by: ORTHOPAEDIC SURGERY

## 2023-05-23 PROCEDURE — G0378 HOSPITAL OBSERVATION PER HR: HCPCS

## 2023-05-23 PROCEDURE — 10002801 HB RX 250 W/O HCPCS: Performed by: FAMILY MEDICINE

## 2023-05-23 PROCEDURE — 97116 GAIT TRAINING THERAPY: CPT | Performed by: PHYSICAL THERAPIST

## 2023-05-23 PROCEDURE — 96375 TX/PRO/DX INJ NEW DRUG ADDON: CPT

## 2023-05-23 PROCEDURE — 36415 COLL VENOUS BLD VENIPUNCTURE: CPT | Performed by: ORTHOPAEDIC SURGERY

## 2023-05-23 PROCEDURE — 97535 SELF CARE MNGMENT TRAINING: CPT

## 2023-05-23 PROCEDURE — 96376 TX/PRO/DX INJ SAME DRUG ADON: CPT

## 2023-05-23 PROCEDURE — 99239 HOSP IP/OBS DSCHRG MGMT >30: CPT | Performed by: FAMILY MEDICINE

## 2023-05-23 PROCEDURE — 80048 BASIC METABOLIC PNL TOTAL CA: CPT | Performed by: ORTHOPAEDIC SURGERY

## 2023-05-23 PROCEDURE — 10002803 HB RX 637: Performed by: CLINICAL NURSE SPECIALIST

## 2023-05-23 PROCEDURE — 10004651 HB RX, NO CHARGE ITEM: Performed by: ORTHOPAEDIC SURGERY

## 2023-05-23 PROCEDURE — 97165 OT EVAL LOW COMPLEX 30 MIN: CPT

## 2023-05-23 PROCEDURE — 10002803 HB RX 637: Performed by: FAMILY MEDICINE

## 2023-05-23 RX ORDER — ASPIRIN 81 MG/1
81 TABLET ORAL EVERY 12 HOURS SCHEDULED
Status: SHIPPED | COMMUNITY
Start: 2023-05-23

## 2023-05-23 RX ORDER — TIZANIDINE 2 MG/1
2 TABLET ORAL
Status: SHIPPED | COMMUNITY
Start: 2023-05-23

## 2023-05-23 RX ORDER — TRAMADOL HYDROCHLORIDE 50 MG/1
50 TABLET ORAL EVERY 4 HOURS PRN
Status: DISCONTINUED | OUTPATIENT
Start: 2023-05-23 | End: 2023-05-23 | Stop reason: HOSPADM

## 2023-05-23 RX ORDER — ACETAMINOPHEN 500 MG
500 TABLET ORAL EVERY 12 HOURS SCHEDULED
Status: SHIPPED | COMMUNITY
Start: 2023-05-23

## 2023-05-23 RX ADMIN — HYDROCODONE BITARTRATE AND ACETAMINOPHEN 1 TABLET: 10; 325 TABLET ORAL at 11:46

## 2023-05-23 RX ADMIN — HYDROCODONE BITARTRATE AND ACETAMINOPHEN 1 TABLET: 10; 325 TABLET ORAL at 04:30

## 2023-05-23 RX ADMIN — VENLAFAXINE HYDROCHLORIDE 75 MG: 75 CAPSULE, EXTENDED RELEASE ORAL at 08:46

## 2023-05-23 RX ADMIN — METOPROLOL SUCCINATE 25 MG: 25 TABLET, EXTENDED RELEASE ORAL at 06:02

## 2023-05-23 RX ADMIN — BUPROPION HYDROCHLORIDE 300 MG: 150 TABLET, EXTENDED RELEASE ORAL at 06:04

## 2023-05-23 RX ADMIN — ASPIRIN 81 MG: 81 TABLET, COATED ORAL at 08:47

## 2023-05-23 RX ADMIN — TRAMADOL HYDROCHLORIDE 50 MG: 50 TABLET, FILM COATED ORAL at 08:47

## 2023-05-23 RX ADMIN — DEXAMETHASONE SODIUM PHOSPHATE 10 MG: 10 INJECTION INTRAMUSCULAR; INTRAVENOUS at 11:46

## 2023-05-23 RX ADMIN — KETOROLAC TROMETHAMINE 15 MG: 30 INJECTION, SOLUTION INTRAMUSCULAR; INTRAVENOUS at 06:04

## 2023-05-23 RX ADMIN — VENLAFAXINE HYDROCHLORIDE 37.5 MG: 37.5 CAPSULE, EXTENDED RELEASE ORAL at 08:46

## 2023-05-23 ASSESSMENT — PAIN SCALES - GENERAL
PAINLEVEL_OUTOF10: 2
PAINLEVEL_OUTOF10: 6
PAINLEVEL_OUTOF10: 3
PAINLEVEL_OUTOF10: 7

## 2023-05-23 ASSESSMENT — COGNITIVE AND FUNCTIONAL STATUS - GENERAL
DAILY_ACTIVITY_CONVERTED_SCORE: 57.54
BASIC_MOBILITY_CONVERTED_SCORE: 41.05
BASIC_MOBILITY_RAW_SCORE: 18
DAILY_ACTIVITY_RAW_SCORE: 24

## 2023-05-23 ASSESSMENT — PATIENT HEALTH QUESTIONNAIRE - PHQ9
SUM OF ALL RESPONSES TO PHQ9 QUESTIONS 1 AND 2: 0
IS PATIENT ABLE TO COMPLETE PHQ2 OR PHQ9: YES
CLINICAL INTERPRETATION OF PHQ2 SCORE: NO FURTHER SCREENING NEEDED

## 2023-05-23 ASSESSMENT — ENCOUNTER SYMPTOMS
PAIN SEVERITY NOW: 2
PAIN SEVERITY NOW: 4

## 2023-05-23 ASSESSMENT — ACTIVITIES OF DAILY LIVING (ADL): HOME_MANAGEMENT_TIME_ENTRY: 28

## 2023-06-02 ENCOUNTER — OFFICE VISIT (OUTPATIENT)
Dept: ORTHOPEDICS | Age: 63
End: 2023-06-02

## 2023-06-02 VITALS — HEIGHT: 72 IN | WEIGHT: 260 LBS | BODY MASS INDEX: 35.21 KG/M2

## 2023-06-02 DIAGNOSIS — Z96.641 S/P TOTAL RIGHT HIP ARTHROPLASTY: Primary | ICD-10-CM

## 2023-06-02 PROCEDURE — 99024 POSTOP FOLLOW-UP VISIT: CPT | Performed by: PHYSICIAN ASSISTANT

## 2023-06-02 RX ORDER — HYDROCODONE BITARTRATE AND ACETAMINOPHEN 7.5; 325 MG/1; MG/1
1 TABLET ORAL EVERY 6 HOURS PRN
Qty: 15 TABLET | Refills: 0 | Status: SHIPPED | OUTPATIENT
Start: 2023-06-02

## 2023-06-07 ENCOUNTER — EXTERNAL RECORD (OUTPATIENT)
Dept: HEALTH INFORMATION MANAGEMENT | Facility: OTHER | Age: 63
End: 2023-06-07

## 2023-06-07 DIAGNOSIS — Z96.641 S/P TOTAL RIGHT HIP ARTHROPLASTY: ICD-10-CM

## 2023-06-07 RX ORDER — CELECOXIB 200 MG/1
CAPSULE ORAL
Qty: 60 CAPSULE | Refills: 0 | Status: SHIPPED | OUTPATIENT
Start: 2023-06-07

## 2023-06-08 ENCOUNTER — CASE MANAGEMENT (OUTPATIENT)
Dept: CARE COORDINATION | Age: 63
End: 2023-06-08

## 2023-07-07 ENCOUNTER — IMAGING SERVICES (OUTPATIENT)
Dept: GENERAL RADIOLOGY | Age: 63
End: 2023-07-07
Attending: ORTHOPAEDIC SURGERY

## 2023-07-07 ENCOUNTER — OFFICE VISIT (OUTPATIENT)
Dept: ORTHOPEDICS | Age: 63
End: 2023-07-07

## 2023-07-07 VITALS — WEIGHT: 260 LBS | BODY MASS INDEX: 28.56 KG/M2

## 2023-07-07 DIAGNOSIS — Z96.641 S/P TOTAL RIGHT HIP ARTHROPLASTY: Primary | ICD-10-CM

## 2023-07-07 PROCEDURE — 73502 X-RAY EXAM HIP UNI 2-3 VIEWS: CPT | Performed by: ORTHOPAEDIC SURGERY

## 2023-07-07 PROCEDURE — 99024 POSTOP FOLLOW-UP VISIT: CPT | Performed by: ORTHOPAEDIC SURGERY

## (undated) DEVICE — GLOVE SURG 7 PROTEXIS PI LF CRM PF BEAD CUFF STRL PLISPRN

## (undated) DEVICE — Device

## (undated) DEVICE — SUTURE STRATAFIX MONOCRYL + ANBCTRL UNDIR ABS

## (undated) DEVICE — ELECTRODE ESURG 10FT 2 DSPR ADH BRDR PULL TAB PREATTACH CBL

## (undated) DEVICE — TOWEL OR BLU 16X26IN 4PK

## (undated) DEVICE — GLOVE SURG 8 PROTEXIS PI LF CRM PF BEAD CUFF STRL PLISPRN

## (undated) DEVICE — MARKER 6IN RULER LG RSRV REG TIP SKIN STRL LF GENTIAN VIOL

## (undated) DEVICE — SPONGE LAPAROTOMY 18X18IN STERILE 5 PK

## (undated) DEVICE — CANISTER WND DRN 500ML DISP LF STRL INFOVAC SENSATRAC TUBE

## (undated) DEVICE — DRAPE ANTIMICROBIAL INCS 13X13IN SURG IOBN2 STRL

## (undated) DEVICE — SUTURE VICRYL 2-0 FS1 27IN BRAID COAT ABS UNDYED J443H

## (undated) DEVICE — DRAPE .75 SHT FNFLD 76X52IN SURG CNVRT STRL LF DISP TIBURON

## (undated) DEVICE — GLOVE SURG 8 PROTEXIS LF BLUE PF SMTH BEAD CUFF INTLK STRL

## (undated) DEVICE — JOINT POINT HIP NAV RENTAL

## (undated) DEVICE — DECANTER FLUID DSPNSR BAG BAJ DISP STRL LF ASEPTIC TRNSF

## (undated) DEVICE — DRAPE SHT FNFLD 98X77IN XL SURG CNVRT STRL LF DISP PINK

## (undated) DEVICE — SET INTPLS BN CLN TIP SCT TUBE HNDPC STRL LF DISP

## (undated) DEVICE — SYSTEM WND IRR IRRISEPT DBRD CLNSG 0.05% CHG STRL LF

## (undated) DEVICE — SEALER ESURG .226IN .137IN AQUAMANTYS 6 30D .236IN SPACE BP

## (undated) DEVICE — HOOD SRG FLYTE SURGICOOL PEELAWAY STRL LF DISP

## (undated) DEVICE — LAWSON - SPONGE XRAY DTBLE STL BAND 4X4

## (undated) DEVICE — BLADE SAW 73.8X18.6MM THK.8MM MED NAR SAGITTAL SS STRL LF

## (undated) DEVICE — COVER EQUIPMENT C ARM

## (undated) DEVICE — DRAPE 2 INCS FILM POUCH ISL 129X100IN LG 27X12IN SURG STRDRP

## (undated) DEVICE — COVER STAND 55X29.5IN CNVRT MAYO TIBURON REINFORCE STRL LF

## (undated) DEVICE — GOWN SURG 2XL XLONG L4 RAGLAN SLV BRTHBL STRL LF DISP

## (undated) DEVICE — CONSTRUCT HIP TOTAL CONVENT - JOHNSON

## (undated) DEVICE — COVER 48IN 2 TIER PAD HVDTY BACK STRL BLUE EQUIPMENT

## (undated) DEVICE — KIT DRSG 20CM PREVENA PEEL

## (undated) DEVICE — GLOVE SURG 7.5 PROTEXIS LF BLUE PF SMTH BEAD CUFF INTLK STRL

## (undated) DEVICE — KIT PSTN 9IN PERI POST CVR STRAP ADJ ARMBRD PAD SUPN HDRST

## (undated) DEVICE — NEEDLE SPNL 18GA 3.5IN PVC FREE DEHP-FR STRL LF SPNCN

## (undated) DEVICE — SUTURE VICRYL 1 CT1 27IN BRAID COAT ABS VIOL J341H

## (undated) DEVICE — ELECTRODE ESURG BLADE 10FT 38IN PVC FREE ULTRA LIGHT TUBE

## (undated) DEVICE — GLOVE SURG 7.5 PROTEXIS PI LF CRM PF BEAD CUFF STRL PLISPRN

## (undated) DEVICE — SYSTEM NEG PRSS PREVENA + 150ML 125 MMHG CNSTR NS LF DISP

## (undated) DEVICE — GLOVE SURG 8.5 PROTEXIS PI LF CRM PF BEAD CUFF STRL PLISPRN